# Patient Record
Sex: FEMALE | Race: WHITE | NOT HISPANIC OR LATINO | Employment: FULL TIME | URBAN - METROPOLITAN AREA
[De-identification: names, ages, dates, MRNs, and addresses within clinical notes are randomized per-mention and may not be internally consistent; named-entity substitution may affect disease eponyms.]

---

## 2018-04-21 ENCOUNTER — OFFICE VISIT (OUTPATIENT)
Dept: URGENT CARE | Facility: CLINIC | Age: 33
End: 2018-04-21
Payer: COMMERCIAL

## 2018-04-21 VITALS
HEART RATE: 100 BPM | OXYGEN SATURATION: 98 % | DIASTOLIC BLOOD PRESSURE: 74 MMHG | SYSTOLIC BLOOD PRESSURE: 122 MMHG | TEMPERATURE: 97.9 F

## 2018-04-21 DIAGNOSIS — J02.9 SORE THROAT: ICD-10-CM

## 2018-04-21 DIAGNOSIS — J02.0 STREP PHARYNGITIS: Primary | ICD-10-CM

## 2018-04-21 LAB — S PYO AG THROAT QL: POSITIVE

## 2018-04-21 PROCEDURE — G0382 LEV 3 HOSP TYPE B ED VISIT: HCPCS | Performed by: FAMILY MEDICINE

## 2018-04-21 PROCEDURE — 87430 STREP A AG IA: CPT | Performed by: FAMILY MEDICINE

## 2018-04-21 RX ORDER — AMOXICILLIN 500 MG/1
500 CAPSULE ORAL EVERY 8 HOURS SCHEDULED
Qty: 30 CAPSULE | Refills: 0 | Status: SHIPPED | OUTPATIENT
Start: 2018-04-21 | End: 2018-05-01

## 2018-04-21 NOTE — PATIENT INSTRUCTIONS
For strep screen was positive today  We were informed that you have strep pharyngitis  Please continue with the amoxicillin 500 milligrams 3 times a day for a full 10 days

## 2018-04-21 NOTE — PROGRESS NOTES
[facility] - Patient Visit Note  Donovan Najera 27 y o  female MRN: [id number]      Assessment / Plan:  Sore throat [J02 9]  1  Sore throat  POCT rapid strepA   2  Strep pharyngitis       Reason For Visit / Chief Complaint  Chief Complaint   Patient presents with    Sore Throat     x 2 days             Jean Marie Mayorga Discussion:  Strep screen is positive patient will be treated with amoxicillin for 10 days  HPI:  Donovan Najera is a 27 y o  female Patient           Who  Presents with both her children  Patient states that she has had a sore throat for approximately 3-4 days  She has had a low-grade fever but has not documented the same fever started on Thursday  She has also had the chills  She is having difficult time swallowing due to the pain  She has been nauseated with the same  Both children are not ill  Patient denies pregnancy  She is allergic to hydrocodone  Strep screen is positive this morning         Historical Information   No past medical history on file  No past surgical history on file  Social History   History   Alcohol use Not on file     History   Drug use: Unknown     History   Smoking Status    Not on file   Smokeless Tobacco    Not on file     No family history on file  ALLERGIES:         Allergies   Allergen Reactions    Hydrocodone        MEDS:  No current outpatient prescriptions on file      FACILITY ADMINISTERED MEDS:        REVIEW OF SYSTEMS    GENERAL: NEGATIVE for:  Generalized Fatigue                             Chills                              Fever                             Myalgias     OPTHALMIC: NEGATIVE for:  Diplopia                            Scotomata                            Visual Changes                            Blurred Vision     ENT:  [facility] [place] for:  Hearing Difficulty                            Tinnitus                            Vertigo Dizziness                            Ear Pain                            Ear Drainage               NOSE NEGATIVE for:  Nasal Congestion                            Nasal Discharge                            Sinus Pain / Pressure               THROAT Positive for:  Sore Throat / Throat Pain                            Difficulty Swallowing     RESPIRATORY: NEGATIVE for:  Cough                            Wheezing                            Sputum Production                            Sob / Tachypnea                            Hemoptysis     CARDIOVASCULAR: NEGATIVE for:  Chest Pain                             SOB (cardiac Related)                             Dyspnea on Exertion                             Orthopnea                             PND                             Leg Edema                             Palpitations                               Irregularities/rythym                       CURRENT VITALS:   Blood Pressure: 122/74 (04/21/18 1013)  Pulse: 100 (04/21/18 1013)  Temperature: 97 9 °F (36 6 °C) (04/21/18 1013)  SpO2: 98 % (04/21/18 1013)  /74   Pulse 100   Temp 97 9 °F (36 6 °C)   SpO2 98%       PHYSICAL EXAM:         General Appearance:    Alert, cooperative, no apparent distress, appears stated age     Oriented x3    Head:    Normocephalic, without obvious abnormality, atraumatic   Eyes:      EOM's intact,      OTILIA,        conjunctiva/corneas clear,          fundi not visualized well   Ears:     Normal external ear canals     Tm right side  Normal     Tm left side    Normal       Nose:   Nares normal externally, septum midline,     mucosa normal,     No anterior drainage         Sinuses   with out   tenderness to palpation / percussion     Throat:   Lips, mucosa, and tongue normal       Anterior pharynx shows edematous pillars with blisters  Consistent with strep      Posterior pharynx   Normal      No exudate obvious       Neck:   Supple, symmetrical, trachea midline and moveable    Normal thyroid click present    No carotid bruits appreciated        Lymphatics:     Adenopathy in anterior cervical chain  Normal    Adenopathy in posterior cervical chain   Normal     Lungs:     Clear to auscultation bilaterally    No rales    No ronchi    No wheeze     Heart[de-identified]    Regular rate and rhythm, S1 and S2 normal,     No S3, S4, audible    No murmurs, rubs      Extremities:     Extremities grossly normal     atraumatic,     no cyanosis or edema        Skin:     Skin color, texture, turgor normal, no rashes or lesions                         Follow up at primary care in 2  days    Counseling / Coordination of Care  Total clinic time spent today  15  minutes  Greater than 50% of total time was spent with the patient and / or family counseling and / or coordination of care  Portions of the record may have been created with voice recognition software   Occasional wrong word or "sound a like" substitutions may have occurred due to the inherent limitations of voice recognition software   Read the chart carefully and recognize, using context, where substitutions have occurred

## 2018-08-07 PROCEDURE — G0145 SCR C/V CYTO,THINLAYER,RESCR: HCPCS | Performed by: OBSTETRICS & GYNECOLOGY

## 2018-08-07 PROCEDURE — 87624 HPV HI-RISK TYP POOLED RSLT: CPT | Performed by: OBSTETRICS & GYNECOLOGY

## 2018-08-08 ENCOUNTER — LAB REQUISITION (OUTPATIENT)
Dept: LAB | Facility: HOSPITAL | Age: 33
End: 2018-08-08

## 2018-08-08 DIAGNOSIS — Z01.419 ENCOUNTER FOR GYNECOLOGICAL EXAMINATION WITHOUT ABNORMAL FINDING: ICD-10-CM

## 2018-08-12 LAB — HPV RRNA GENITAL QL NAA+PROBE: NORMAL

## 2018-08-14 LAB
LAB AP GYN PRIMARY INTERPRETATION: NORMAL
LAB AP LMP: NORMAL
Lab: NORMAL

## 2019-07-10 ENCOUNTER — OFFICE VISIT (OUTPATIENT)
Dept: URGENT CARE | Facility: CLINIC | Age: 34
End: 2019-07-10
Payer: COMMERCIAL

## 2019-07-10 VITALS
OXYGEN SATURATION: 98 % | WEIGHT: 293 LBS | BODY MASS INDEX: 45.99 KG/M2 | HEIGHT: 67 IN | RESPIRATION RATE: 18 BRPM | TEMPERATURE: 97.9 F | SYSTOLIC BLOOD PRESSURE: 132 MMHG | DIASTOLIC BLOOD PRESSURE: 80 MMHG

## 2019-07-10 DIAGNOSIS — S39.012A LUMBAR STRAIN, INITIAL ENCOUNTER: Primary | ICD-10-CM

## 2019-07-10 PROCEDURE — 96372 THER/PROPH/DIAG INJ SC/IM: CPT | Performed by: PHYSICIAN ASSISTANT

## 2019-07-10 PROCEDURE — 99213 OFFICE O/P EST LOW 20 MIN: CPT | Performed by: PHYSICIAN ASSISTANT

## 2019-07-10 RX ORDER — NORETHINDRONE ACETATE/ETHINYL ESTRADIOL AND FERROUS FUMARATE 1.5-30(21)
KIT ORAL
Refills: 0 | COMMUNITY
Start: 2019-07-03

## 2019-07-10 RX ORDER — KETOROLAC TROMETHAMINE 30 MG/ML
30 INJECTION, SOLUTION INTRAMUSCULAR; INTRAVENOUS ONCE
Status: COMPLETED | OUTPATIENT
Start: 2019-07-10 | End: 2019-07-10

## 2019-07-10 RX ORDER — METHYLPREDNISOLONE 4 MG/1
TABLET ORAL
Qty: 1 EACH | Refills: 0 | Status: SHIPPED | OUTPATIENT
Start: 2019-07-10 | End: 2019-11-15

## 2019-07-10 RX ORDER — BACLOFEN 10 MG/1
10 TABLET ORAL 3 TIMES DAILY PRN
Qty: 21 TABLET | Refills: 0 | Status: SHIPPED | OUTPATIENT
Start: 2019-07-10

## 2019-07-10 RX ADMIN — KETOROLAC TROMETHAMINE 30 MG: 30 INJECTION, SOLUTION INTRAMUSCULAR; INTRAVENOUS at 08:48

## 2019-07-10 NOTE — PROGRESS NOTES
[facility]        NAME: Etelvina Meléndez is a 26 y o  female  : 1985    MRN: [id number]  DATE: July 10, 2019  TIME: 8:40 AM    Assessment and Plan   Lumbar strain, initial encounter [W60 690B]  1  Lumbar strain, initial encounter  ketorolac (TORADOL) injection 30 mg    methylPREDNISolone 4 MG tablet therapy pack    baclofen 10 mg tablet         Patient Instructions     Recommend medrol pack for symptomatic relief  Encouraged continued activity and gentle ROM exercises  Moist heat as needed and tolerated  Follow up with PCP in 3-5 days  Proceed to  ER if symptoms worsen  Chief Complaint     Chief Complaint   Patient presents with    Back Pain     Pulled muscle in back yesterday now has lower back pain          History of Present Illness       27 y/o F presents for eval of low back pain and spasm onset yesterday  Patient states she was lifting and moving stuff at work when she felt herself pull her back out  She states she has done this in the past but denies specific back injury  She denies any numbness or tingling, fever, N/V, saddle anesthesia, bowel or bladder dysfunction  Pt has tried ibuprofen without improvement  Review of Systems   Review of Systems   All other systems reviewed and are negative          Current Medications       Current Outpatient Medications:     ROCÍO FE 1  1 5-30 MG-MCG tablet, , Disp: , Rfl: 0    baclofen 10 mg tablet, Take 1 tablet (10 mg total) by mouth 3 (three) times a day as needed for muscle spasms, Disp: 21 tablet, Rfl: 0    methylPREDNISolone 4 MG tablet therapy pack, Use as directed on package, Disp: 1 each, Rfl: 0    Current Facility-Administered Medications:     ketorolac (TORADOL) injection 30 mg, 30 mg, Intramuscular, Once, Jasmina Blanchard PA-C    Current Allergies     Allergies as of 07/10/2019 - Reviewed 07/10/2019   Allergen Reaction Noted    Hydrocodone  2018            The following portions of the patient's history were reviewed and updated as appropriate: allergies, current medications, past family history, past medical history, past social history, past surgical history and problem list      History reviewed  No pertinent past medical history  History reviewed  No pertinent surgical history  No family history on file  Medications have been verified  Objective   /80   Temp 97 9 °F (36 6 °C) (Tympanic)   Resp 18   Ht 5' 7" (1 702 m)   Wt (!) 145 kg (320 lb)   LMP 06/24/2019   SpO2 98%   BMI 50 12 kg/m²        Physical Exam     Physical Exam   Constitutional: She is oriented to person, place, and time  She appears well-developed and well-nourished  No distress  HENT:   Head: Normocephalic and atraumatic  Cardiovascular: Normal rate, regular rhythm and normal heart sounds  Exam reveals no gallop and no friction rub  No murmur heard  Pulmonary/Chest: Effort normal and breath sounds normal  She has no wheezes  She has no rales  Musculoskeletal:        Lumbar back: She exhibits decreased range of motion, edema and deformity  She exhibits no spasm  Neurological: She is alert and oriented to person, place, and time  Psychiatric: She has a normal mood and affect  Vitals reviewed

## 2019-11-15 ENCOUNTER — OFFICE VISIT (OUTPATIENT)
Dept: URGENT CARE | Facility: CLINIC | Age: 34
End: 2019-11-15
Payer: COMMERCIAL

## 2019-11-15 VITALS
OXYGEN SATURATION: 97 % | HEART RATE: 110 BPM | WEIGHT: 293 LBS | BODY MASS INDEX: 45.99 KG/M2 | TEMPERATURE: 99 F | DIASTOLIC BLOOD PRESSURE: 78 MMHG | HEIGHT: 67 IN | SYSTOLIC BLOOD PRESSURE: 118 MMHG | RESPIRATION RATE: 18 BRPM

## 2019-11-15 DIAGNOSIS — J02.0 STREP PHARYNGITIS: Primary | ICD-10-CM

## 2019-11-15 LAB — S PYO AG THROAT QL: POSITIVE

## 2019-11-15 PROCEDURE — 99213 OFFICE O/P EST LOW 20 MIN: CPT | Performed by: PHYSICIAN ASSISTANT

## 2019-11-15 PROCEDURE — 87880 STREP A ASSAY W/OPTIC: CPT | Performed by: PHYSICIAN ASSISTANT

## 2019-11-15 RX ORDER — AMOXICILLIN 875 MG/1
875 TABLET, COATED ORAL 2 TIMES DAILY
Qty: 20 TABLET | Refills: 0 | Status: SHIPPED | OUTPATIENT
Start: 2019-11-15 | End: 2019-11-25

## 2019-11-15 NOTE — PROGRESS NOTES
[facility]        NAME: Aleks Bell is a 26 y o  female  : 1985    MRN: [id number]  DATE: November 15, 2019  TIME: 9:03 AM    Assessment and Plan   Strep pharyngitis [J02 0]  1  Strep pharyngitis  POCT rapid strepA    amoxicillin (AMOXIL) 875 mg tablet         Patient Instructions     POCT rapid strep positive - recommend treatment with antibiotic therapy at this time  Also recommend supportive measures:   1  Push fluids and rest   2  OTC Tylenol/Motrin as needed for pain/fever   3  Warm salt water gargles    Follow up with PCP in 3-5 days  Proceed to  ER if symptoms worsen  Chief Complaint     Chief Complaint   Patient presents with    Sore Throat     C/O sore throat and pressure in ears with fever which resolved x 2 days  History of Present Illness       27 y/o F presents for eval of sore throat onset 2 days ago with associated body aches, head pressure, subjective fever/chills  She has been taking dayquil/nyquil with improvement in fever  Review of Systems   Review of Systems   All other systems reviewed and are negative  Current Medications       Current Outpatient Medications:     ROCÍO FE 1  1 5-30 MG-MCG tablet, , Disp: , Rfl: 0    amoxicillin (AMOXIL) 875 mg tablet, Take 1 tablet (875 mg total) by mouth 2 (two) times a day for 10 days, Disp: 20 tablet, Rfl: 0    baclofen 10 mg tablet, Take 1 tablet (10 mg total) by mouth 3 (three) times a day as needed for muscle spasms (Patient not taking: Reported on 11/15/2019), Disp: 21 tablet, Rfl: 0    Current Allergies     Allergies as of 11/15/2019 - Reviewed 11/15/2019   Allergen Reaction Noted    Hydrocodone  2018            The following portions of the patient's history were reviewed and updated as appropriate: allergies, current medications, past family history, past medical history, past social history, past surgical history and problem list      History reviewed   No pertinent past medical history  Past Surgical History:   Procedure Laterality Date    ANKLE SURGERY         No family history on file  Medications have been verified  Objective   /78   Pulse (!) 110   Temp 99 °F (37 2 °C) (Tympanic)   Resp 18   Ht 5' 7" (1 702 m)   Wt (!) 145 kg (320 lb)   LMP 11/13/2019   SpO2 97%   BMI 50 12 kg/m²        Physical Exam     Physical Exam   Constitutional: She is oriented to person, place, and time  She appears well-developed and well-nourished  No distress  HENT:   Head: Normocephalic and atraumatic  Right Ear: Hearing, tympanic membrane, external ear and ear canal normal    Left Ear: Hearing, tympanic membrane, external ear and ear canal normal    Nose: Nose normal    Mouth/Throat: Uvula is midline and mucous membranes are normal  Posterior oropharyngeal erythema present  Tonsillar exudate  Eyes: Pupils are equal, round, and reactive to light  Conjunctivae and EOM are normal    Cardiovascular: Normal rate and regular rhythm  Exam reveals no gallop and no friction rub  No murmur heard  Pulmonary/Chest: Effort normal and breath sounds normal  No respiratory distress  She has no wheezes  She has no rales  Neurological: She is alert and oriented to person, place, and time  Skin: Skin is warm and dry  Nursing note and vitals reviewed

## 2020-05-06 ENCOUNTER — OFFICE VISIT (OUTPATIENT)
Dept: URGENT CARE | Facility: CLINIC | Age: 35
End: 2020-05-06

## 2020-05-06 VITALS
WEIGHT: 293 LBS | OXYGEN SATURATION: 99 % | RESPIRATION RATE: 18 BRPM | HEIGHT: 66 IN | HEART RATE: 99 BPM | SYSTOLIC BLOOD PRESSURE: 135 MMHG | TEMPERATURE: 98.1 F | DIASTOLIC BLOOD PRESSURE: 94 MMHG | BODY MASS INDEX: 47.09 KG/M2

## 2020-05-06 DIAGNOSIS — J02.9 SORE THROAT: ICD-10-CM

## 2020-05-06 DIAGNOSIS — J02.0 STREP PHARYNGITIS: Primary | ICD-10-CM

## 2020-05-06 LAB — S PYO AG THROAT QL: POSITIVE

## 2020-05-06 PROCEDURE — 99214 OFFICE O/P EST MOD 30 MIN: CPT | Performed by: FAMILY MEDICINE

## 2020-05-06 PROCEDURE — 87880 STREP A ASSAY W/OPTIC: CPT | Performed by: FAMILY MEDICINE

## 2020-05-06 RX ORDER — AMOXICILLIN 875 MG/1
875 TABLET, COATED ORAL 2 TIMES DAILY
Qty: 20 TABLET | Refills: 0 | Status: SHIPPED | OUTPATIENT
Start: 2020-05-06 | End: 2020-05-16

## 2020-09-30 ENCOUNTER — OFFICE VISIT (OUTPATIENT)
Dept: URGENT CARE | Facility: CLINIC | Age: 35
End: 2020-09-30
Payer: COMMERCIAL

## 2020-09-30 VITALS
HEART RATE: 117 BPM | SYSTOLIC BLOOD PRESSURE: 136 MMHG | BODY MASS INDEX: 47.09 KG/M2 | RESPIRATION RATE: 20 BRPM | TEMPERATURE: 98.3 F | HEIGHT: 66 IN | OXYGEN SATURATION: 95 % | DIASTOLIC BLOOD PRESSURE: 97 MMHG | WEIGHT: 293 LBS

## 2020-09-30 DIAGNOSIS — J06.9 ACUTE RESPIRATORY DISEASE: Primary | ICD-10-CM

## 2020-09-30 PROCEDURE — G0382 LEV 3 HOSP TYPE B ED VISIT: HCPCS | Performed by: PHYSICIAN ASSISTANT

## 2020-09-30 NOTE — PATIENT INSTRUCTIONS
Some studies suggest that Zinc 12 5-15mg every 2 hours while awake x 5 days may shorten the duration cold symptoms by 1-2 days  Fluids and rest  Flonase nasal spray  Afrin if severe congestion (do not use for more than 3 days)  Warm compresses over sinuses  Steam treatment (utilize proper safety precautions when in contact with hot water/steam)  Follow up with PCP in 3-5 days  Proceed to  ER if symptoms worsen  Cold Symptoms   WHAT YOU NEED TO KNOW:   A cold is an infection caused by a virus  The infection causes your upper respiratory system to become inflamed  Common symptoms of a cold include sneezing, dry throat, a stuffy nose, headache, watery eyes, and a cough  Your cough may be dry, or you may cough up mucus  You may also have muscle aches, joint pain, and tiredness  Rarely, you may have a fever  Most colds go away without treatment  DISCHARGE INSTRUCTIONS:   Return to the emergency department if:   · You have increased tiredness and weakness  · You are unable to eat  · Your heart is beating much faster than usual for you  · You see white spots in the back of your throat and your neck is swollen and sore to the touch  · You see pinpoint or larger reddish-purple dots on your skin  Contact your healthcare provider if:   · You have a fever higher than 102°F (38 9°C)  · You have new or worsening shortness of breath  · You have thick nasal drainage for more than 2 days  · Your symptoms do not improve or get worse within 5 days  · You have questions or concerns about your condition or care  Medicines: The following medicines may be suggested by your healthcare provider to decrease your cold symptoms  These medicines are available without a doctor's order  Ask which medicines to take and when to take them  Follow directions  · NSAIDs or acetaminophen  help to bring down a fever or decrease pain  · Decongestants  help decrease nasal stuffiness       · Antihistamines  help decrease sneezing and a runny nose  · Cough suppressants  help decrease how much you cough  · Expectorants  help loosen mucus so you can cough it up  · Take your medicine as directed  Contact your healthcare provider if you think your medicine is not helping or if you have side effects  Tell him of her if you are allergic to any medicine  Keep a list of the medicines, vitamins, and herbs you take  Include the amounts, and when and why you take them  Bring the list or the pill bottles to follow-up visits  Carry your medicine list with you in case of an emergency  Symptom relief: The following may help relieve cold symptoms, such as a dry throat and congestion:  · Gargle with mouthwash or warm salt water as directed  · Suck on throat lozenges or hard candy  · Use a cold or warm vaporizer or humidifier to ease your breathing  · Rest for at least 2 days and then as needed to decrease tiredness and weakness  · Use petroleum based jelly around your nostrils to decrease irritation from blowing your nose  Drink liquids:  Liquids will help thin and loosen thick mucus so you can cough it up  Liquids will also keep you hydrated  Ask your healthcare provider which liquids are best for you and how much to drink each day  Prevent the spread of germs: You can spread your cold germs to others for at least 3 days after your symptoms start  Wash your hands often  Do not share items, such as eating utensils  Cover your nose and mouth when you cough or sneeze using the crook of your elbow instead of your hands  Throw used tissues in the garbage  Do not smoke:  Smoking may worsen your symptoms and increase the length of time you feel sick  Talk with your healthcare provider if you need help to stop smoking  Follow up with your healthcare provider as directed:  Write down your questions so you remember to ask them during your visits     © 2017 [employer] Information is for End User's use only and may not be sold, redistributed or otherwise used for commercial purposes  All illustrations and images included in CareNotes® are the copyrighted property of A [employer]  or [employer]var[employer] Osborne  The above information is an  only  It is not intended as medical advice for individual conditions or treatments  Talk to your doctor, nurse or pharmacist before following any medical regimen to see if it is safe and effective for you

## 2020-09-30 NOTE — PROGRESS NOTES
[facility]        NAME: Casey Rebollar is a 37 y o  female  : 1985    MRN: [id number]  DATE: 2020  TIME: 2:31 PM    Assessment and Plan   Acute respiratory disease [J06 9]  1  Acute respiratory disease       Patient declined COVID testing  Patient Instructions     Some studies suggest that Zinc 12 5-15mg every 2 hours while awake x 5 days may shorten the duration cold symptoms by 1-2 days  Fluids and rest  Flonase nasal spray  Afrin if severe congestion (do not use for more than 3 days)  Warm compresses over sinuses  Steam treatment (utilize proper safety precautions when in contact with hot water/steam)  Follow up with PCP in 3-5 days  Proceed to  ER if symptoms worsen  Chief Complaint     Chief Complaint   Patient presents with    Cold Like Symptoms     c/o sinus pressure and cough since Monday, sore throat on Monday which is now gone  Denies fever  History of Present Illness       Denies surgery in past 4 weeks, immobilization over 3 days, calf pain/swelling, hemoptysis, prior DVT/PE, clotting disorder, or current CA diagnosis/tx  URI    This is a new problem  Episode onset: 2d  The problem has been gradually improving  There has been no fever  Associated symptoms include coughing (mild productive and intermittent ), headaches and a sore throat (resolved)  Pertinent negatives include no abdominal pain, congestion, ear pain, nausea, rash, rhinorrhea, sinus pain, sneezing or vomiting  Treatments tried: tylenol cold and flu  The treatment provided moderate relief  Review of Systems   Review of Systems   Constitutional: Positive for fatigue  Negative for chills and fever  HENT: Positive for postnasal drip, sinus pressure, sore throat (resolved) and voice change  Negative for congestion, ear pain, rhinorrhea, sinus pain and sneezing  Respiratory: Positive for cough (mild productive and intermittent )      Gastrointestinal: Negative for abdominal pain, nausea and vomiting  Musculoskeletal: Negative for myalgias  Skin: Negative for rash  Neurological: Positive for headaches  Current Medications       Current Outpatient Medications:     ROCÍO FE 1 5/30 1 5-30 MG-MCG tablet, , Disp: , Rfl: 0    baclofen 10 mg tablet, Take 1 tablet (10 mg total) by mouth 3 (three) times a day as needed for muscle spasms (Patient not taking: Reported on 11/15/2019), Disp: 21 tablet, Rfl: 0    Current Allergies     Allergies as of 09/30/2020 - Reviewed 09/30/2020   Allergen Reaction Noted    Hydrocodone  04/21/2018            The following portions of the patient's history were reviewed and updated as appropriate: allergies, current medications, past family history, past medical history, past social history, past surgical history and problem list      History reviewed  No pertinent past medical history  Past Surgical History:   Procedure Laterality Date    ANKLE SURGERY         No family history on file  Medications have been verified  Objective   /97 (BP Location: Left arm, Patient Position: Sitting, Cuff Size: Large)   Pulse (!) 117   Temp 98 3 °F (36 8 °C) (Temporal)   Resp 20   Ht 5' 6" (1 676 m)   Wt (!) 152 kg (335 lb)   SpO2 95%   BMI 54 07 kg/m²        Physical Exam     Physical Exam  Vitals signs reviewed  Constitutional:       General: She is not in acute distress  Appearance: She is well-developed  She is not diaphoretic  HENT:      Head: Normocephalic  Comments: No sinus TTP     Right Ear: Tympanic membrane and external ear normal       Left Ear: Tympanic membrane and external ear normal       Nose: Nose normal       Mouth/Throat:      Pharynx: No oropharyngeal exudate or posterior oropharyngeal erythema  Eyes:      Conjunctiva/sclera: Conjunctivae normal    Cardiovascular:      Rate and Rhythm: Normal rate and regular rhythm  Heart sounds: Normal heart sounds  No murmur  No friction rub  No gallop  Pulmonary:      Effort: Pulmonary effort is normal  No respiratory distress  Breath sounds: Normal breath sounds  No wheezing or rales  Chest:      Chest wall: No tenderness  Lymphadenopathy:      Cervical: No cervical adenopathy  Skin:     General: Skin is warm  Neurological:      Mental Status: She is alert and oriented to person, place, and time  Psychiatric:         Behavior: Behavior normal          Thought Content:  Thought content normal          Judgment: Judgment normal

## 2020-12-15 ENCOUNTER — TELEPHONE (OUTPATIENT)
Dept: BARIATRICS | Facility: CLINIC | Age: 35
End: 2020-12-15

## 2021-02-03 ENCOUNTER — IMMUNIZATIONS (OUTPATIENT)
Dept: FAMILY MEDICINE CLINIC | Facility: HOSPITAL | Age: 36
End: 2021-02-03

## 2021-02-03 DIAGNOSIS — Z23 ENCOUNTER FOR IMMUNIZATION: Primary | ICD-10-CM

## 2021-02-03 PROCEDURE — 91301 SARS-COV-2 / COVID-19 MRNA VACCINE (MODERNA) 100 MCG: CPT

## 2021-02-03 PROCEDURE — 0011A SARS-COV-2 / COVID-19 MRNA VACCINE (MODERNA) 100 MCG: CPT

## 2021-03-01 ENCOUNTER — IMMUNIZATIONS (OUTPATIENT)
Dept: FAMILY MEDICINE CLINIC | Facility: HOSPITAL | Age: 36
End: 2021-03-01

## 2021-03-01 DIAGNOSIS — Z23 ENCOUNTER FOR IMMUNIZATION: Primary | ICD-10-CM

## 2021-03-01 PROCEDURE — 0012A SARS-COV-2 / COVID-19 MRNA VACCINE (MODERNA) 100 MCG: CPT

## 2021-03-01 PROCEDURE — 91301 SARS-COV-2 / COVID-19 MRNA VACCINE (MODERNA) 100 MCG: CPT

## 2022-11-22 ENCOUNTER — OFFICE VISIT (OUTPATIENT)
Dept: URGENT CARE | Facility: CLINIC | Age: 37
End: 2022-11-22

## 2022-11-22 VITALS
OXYGEN SATURATION: 100 % | HEART RATE: 82 BPM | WEIGHT: 293 LBS | TEMPERATURE: 97.6 F | DIASTOLIC BLOOD PRESSURE: 93 MMHG | SYSTOLIC BLOOD PRESSURE: 122 MMHG | BODY MASS INDEX: 47.09 KG/M2 | HEIGHT: 66 IN

## 2022-11-22 DIAGNOSIS — J02.0 STREP PHARYNGITIS: Primary | ICD-10-CM

## 2022-11-22 DIAGNOSIS — J02.9 SORE THROAT: ICD-10-CM

## 2022-11-22 LAB — S PYO AG THROAT QL: NEGATIVE

## 2022-11-22 RX ORDER — PENICILLIN V POTASSIUM 500 MG/1
500 TABLET ORAL 2 TIMES DAILY
Qty: 20 TABLET | Refills: 0 | Status: SHIPPED | OUTPATIENT
Start: 2022-11-22 | End: 2022-12-02

## 2022-11-22 NOTE — PATIENT INSTRUCTIONS
Your strep A is negative  You have a throat culture pending  You are to download [facility] for the results in 3-4 days  You will be notified if the results are + and an antibiotic will be called in for you  You are to do warm salt water gargles 4 x daily  Drink warm tea with honey and lemon  Take tylenol or motrin as able for pain or fever  Chloraseptic throat spray, cough drops  Do not share utensils  Change your tooth brush in 3 days    Follow up with your PCP in 2-3 days  Go to the ED if symptoms worsen

## 2022-11-22 NOTE — PROGRESS NOTES
[facility]        NAME: María Traore is a 43 y o  female  : 1985    MRN: [id number]  DATE: 2022  TIME: 8:42 AM    Assessment and Plan   Strep pharyngitis [J02 0]  1  Strep pharyngitis  penicillin V potassium (VEETID) 500 mg tablet      2  Sore throat  POCT rapid strepA    penicillin V potassium (VEETID) 500 mg tablet    CANCELED: Throat culture            Patient Instructions       Follow up with PCP in 3-5 days  Proceed to  ER if symptoms worsen  Your strep A is negative  You have a throat culture pending  You are to download  [employer] for the results in 3-4 days  You will be notified if the results are + and an antibiotic will be called in for you  You are to do warm salt water gargles 4 x daily  Drink warm tea with honey and lemon  Take tylenol or motrin as able for pain or fever  Chloraseptic throat spray, cough drops  Do not share utensils  Change your tooth brush in 3 days  Follow up with your PCP in 2-3 days  Go to the ED if symptoms worsen        Chief Complaint     Chief Complaint   Patient presents with   • Sore Throat     For 24h, possible fever- unknown  • Ear Fullness     Bilateral ear pressure x1 day         History of Present Illness       This is a 43year old female who states started yesterday with sorethroat, swollen glands, subjective fever  She states that she took some tylenol  She denies pregnancy  Review of Systems   Review of Systems   Constitutional: Positive for fever  HENT: Positive for sore throat  Negative for congestion  Eyes: Negative  Respiratory: Negative  Cardiovascular: Negative  Gastrointestinal: Negative  Endocrine: Negative  Genitourinary: Negative  Musculoskeletal: Negative  Skin: Negative  Allergic/Immunologic: Negative  Neurological: Negative  Hematological: Negative  Psychiatric/Behavioral: Negative            Current Medications       Current Outpatient Medications:   •  ROCÍO MYRA 1 5/30 1 5-30 MG-MCG tablet, , Disp: , Rfl: 0  •  penicillin V potassium (VEETID) 500 mg tablet, Take 1 tablet (500 mg total) by mouth 2 (two) times a day for 10 days, Disp: 20 tablet, Rfl: 0  •  baclofen 10 mg tablet, Take 1 tablet (10 mg total) by mouth 3 (three) times a day as needed for muscle spasms (Patient not taking: Reported on 11/15/2019), Disp: 21 tablet, Rfl: 0    Current Allergies     Allergies as of 11/22/2022 - Reviewed 11/22/2022   Allergen Reaction Noted   • Hydrocodone Other (See Comments) 04/21/2018            The following portions of the patient's history were reviewed and updated as appropriate: allergies, current medications, past family history, past medical history, past social history, past surgical history and problem list      History reviewed  No pertinent past medical history  Past Surgical History:   Procedure Laterality Date   • ANKLE SURGERY         History reviewed  No pertinent family history  Medications have been verified  Objective   /93   Pulse 82   Temp 97 6 °F (36 4 °C)   Ht 5' 6" (1 676 m)   Wt (!) 154 kg (340 lb 6 4 oz)   SpO2 100%   BMI 54 94 kg/m²   No LMP recorded  Physical Exam     Physical Exam  Vitals and nursing note reviewed  Constitutional:       General: She is not in acute distress  Appearance: She is well-developed  She is obese  She is not ill-appearing, toxic-appearing or diaphoretic  HENT:      Head: Normocephalic and atraumatic  Right Ear: Tympanic membrane and ear canal normal       Left Ear: Tympanic membrane and ear canal normal       Nose: No congestion or rhinorrhea  Mouth/Throat:      Mouth: Mucous membranes are moist  No oral lesions  Pharynx: Uvula midline  Oropharyngeal exudate and posterior oropharyngeal erythema present  No pharyngeal swelling or uvula swelling  Tonsils: Tonsillar exudate present  No tonsillar abscesses  3+ on the right  2+ on the left     Cardiovascular:      Rate and Rhythm: Normal rate and regular rhythm  Heart sounds: Normal heart sounds  No murmur heard  Pulmonary:      Effort: Pulmonary effort is normal       Breath sounds: Normal breath sounds  Abdominal:      General: Bowel sounds are normal       Palpations: Abdomen is soft  Musculoskeletal:      Cervical back: Normal range of motion and neck supple  Lymphadenopathy:      Cervical: Cervical adenopathy present  Skin:     General: Skin is warm and dry  Capillary Refill: Capillary refill takes less than 2 seconds  Neurological:      General: No focal deficit present  Mental Status: She is alert and oriented to person, place, and time     Psychiatric:         Mood and Affect: Mood normal          Behavior: Behavior normal

## 2022-11-22 NOTE — LETTER
November 22, 2022      Patient: Patel Urbano   YOB: 1985   Date of Visit: 11/22/2022       To Whom It May Concern: It is my medical opinion that Patel Urbano may return to work on 11/23/2022  If you have any questions or concerns, please don't hesitate to call           Sincerely,        ALEXUS Hayes    CC:   No Recipients

## 2023-08-17 ENCOUNTER — OFFICE VISIT (OUTPATIENT)
Dept: BARIATRICS | Facility: CLINIC | Age: 38
End: 2023-08-17
Payer: COMMERCIAL

## 2023-08-17 VITALS
DIASTOLIC BLOOD PRESSURE: 80 MMHG | WEIGHT: 293 LBS | RESPIRATION RATE: 17 BRPM | BODY MASS INDEX: 47.09 KG/M2 | HEART RATE: 73 BPM | SYSTOLIC BLOOD PRESSURE: 120 MMHG | HEIGHT: 66 IN

## 2023-08-17 DIAGNOSIS — R63.5 WEIGHT GAIN: ICD-10-CM

## 2023-08-17 DIAGNOSIS — Z30.41 ORAL CONTRACEPTIVE USE: ICD-10-CM

## 2023-08-17 DIAGNOSIS — E66.01 OBESITY, MORBID, BMI 50 OR HIGHER (HCC): Primary | ICD-10-CM

## 2023-08-17 PROCEDURE — 99204 OFFICE O/P NEW MOD 45 MIN: CPT | Performed by: INTERNAL MEDICINE

## 2023-08-17 NOTE — PATIENT INSTRUCTIONS
General Recommendations:  Nutrition:  Eat breakfast daily. Do not skip meals. Food log (ie.) www.myfitnesspal.com, sparkpeople. com, loseit.com, calorieking. com, etc.    Practice mindful eating. Be sure to set aside time to eat, eat slowly, and savor your food. Hydration: At least 64oz of water daily. No sugar sweetened beverages. No juice (eat the fruit instead). Exercise:  Studies have shown that the ideal exercise goal is somewhere between 150 to 300 minutes of moderate intensity exercise a week. Start with exercising 10 minutes every other day and gradually increase physical activity with a goal of at least 150 minutes of moderate intensity exercise a week, divided over at least 3 days a week. An example of this would be exercising 30 minutes a day, 5 days a week. Resistance training can increase muscle mass and increase our resting metabolic rate. FULL BODY resistance training is recommended 2-3 times a week. Do not do this on consecutive days to allow for muscle recovery. Aim for a bare minimum 5000 steps, even on days you do not exercise. Monitoring:   Weigh yourself once a week. Weigh yourself the same time of the day with the same amount of clothing on. Preferably this should be done after waking up, before you eat, and with no clothing or minimal clothing on. Specific Goals:  Food log (ie.) www.Angelantoni.com,[employer],flikdate,calorieking. com,etc.   No sugary beverages. At least 64oz of water daily. Calorie goal:  2000 calories a day (Provided with meal plan to follow). I highly recommend meeting with a dietician. Meet with behavioral health specialist.    Surgical team will look into insurance coverage and contact you. Have thyroid test done and EKG done. Once reviewed and if normal, then you may start phentermine 15mg daily.   Potential side effects of Phentermine: increased heart rate, increased blood pressure, palpitations, headache, dizziness, insomnia, altered mood, abdominal upset, and dry mouth. Notify the provider with change in mood. Please go to the emergency room if you develop thoughts of harming yourself or others. Phentermine should be stopped 2 weeks prior to surgery. Notify the provider with any changes in vision. After starting or increasing dose of phentermine it is advised that you check your resting blood pressure and pulse at least 3 times per week for a month. Vary the time of day and record these results. If you have any resting blood pressures above 140/90 or heart rate above 100 beats per minute, then you must notify your weight loss specialist and primary care doctor.     Return visit:  3 month

## 2023-08-17 NOTE — PROGRESS NOTES
Assessment/Plan:  Mary House was seen today for consult. Diagnoses and all orders for this visit:    Obesity, morbid, BMI 50 or higher ([address])  -     TSH, 3rd generation with Free T4 reflex; Future  -     ECG 12 lead; Future    Weight gain  -     TSH, 3rd generation with Free T4 reflex; Future    Oral contraceptive use       Obesity, morbid, BMI 50 or higher ([address])  - Discussed options of HealthyCORE-Intensive Lifestyle Intervention Program, Very Low Calorie Diet-VLCD, Conservative Program, Chuy-En-Y Gastric Bypass, and Vertical Sleeve Gastrectomy and the role of weight loss medications. - Explained the importance of making lifestyle changes. - Patient is interested in pursuing Chuy-En-Y Gastric Bypass and Vertical Sleeve Gastrectomy  - Initial weight loss goal of 5-10% weight loss for improved health as studies have shown this is where we see the greatest impact on improving health and decreasing risk of obesity related conditions. - Weight loss can improve patient's co-morbid conditions and/or prevent weight-related complications. - Stop Bang 3/8  - Labs and EKG ordered. BP: controlled  Pulse: controlled  Contraception: patient advised of potential harm to fetus if pregnancy occurs, advised contraceptive methods. No blurry vision, depression or anxiety, no insomnia, no heart palpitations or chest pain. PDMP reviewed. No evidence or suspicion of abuse or diversion. Patient understood all instructions and agrees to continue the medications    General Recommendations:  Nutrition:  Eat breakfast daily. Do not skip meals. Food log (ie.) www.Sapiens.com, sparkpeople. com, loseit.com, [employer]. com, etc.    Practice mindful eating. Be sure to set aside time to eat, eat slowly, and savor your food. Hydration: At least 64oz of water daily. No sugar sweetened beverages. No juice (eat the fruit instead).     Exercise:  Studies have shown that the ideal exercise goal is somewhere between 150 to 300 minutes of moderate intensity exercise a week. Start with exercising 10 minutes every other day and gradually increase physical activity with a goal of at least 150 minutes of moderate intensity exercise a week, divided over at least 3 days a week. An example of this would be exercising 30 minutes a day, 5 days a week. Resistance training can increase muscle mass and increase our resting metabolic rate. FULL BODY resistance training is recommended 2-3 times a week. Do not do this on consecutive days to allow for muscle recovery. Aim for a bare minimum 5000 steps, even on days you do not exercise. Monitoring:   Weigh yourself once a week. Weigh yourself the same time of the day with the same amount of clothing on. Preferably this should be done after waking up, before you eat, and with no clothing or minimal clothing on. Specific Goals:  Food log (ie.) www.[employer].com,"[employer]voxappit.com,[employer] com,etc.   No sugary beverages. At least 64oz of water daily. Calorie goal:  2000 calories a day (Provided with meal plan to follow). I highly recommend meeting with a dietician. Meet with behavioral health specialist.    Surgical team will look into insurance coverage and contact you. Have thyroid test done and EKG done. Once reviewed and if normal, then you may start phentermine 15mg daily. Potential side effects of Phentermine: increased heart rate, increased blood pressure, palpitations, headache, dizziness, insomnia, altered mood, abdominal upset, and dry mouth. Notify the provider with change in mood. Please go to the emergency room if you develop thoughts of harming yourself or others. Phentermine should be stopped 2 weeks prior to surgery. Notify the provider with any changes in vision. After starting or increasing dose of phentermine it is advised that you check your resting blood pressure and pulse at least 3 times per week for a month.   Vary the time of day and record these results. If you have any resting blood pressures above 140/90 or heart rate above 100 beats per minute, then you must notify your weight loss specialist and primary care doctor. Return visit:  3 month         Total time spent reviewing chart, interviewing patient, examining patient, discussing plan, answering all questions, and documentin min.       ______________________________________________________________________    Subjective:   Chief Complaint   Patient presents with   • Consult     MWM Consult; Gw-200LB ; Waist- 53IN; Stop bang- 3/8       HPI: Nicholas Cam  is a 42 y.o. female with history of excess weight, here to pursue weight loss management. Previous notes and records have been reviewed. HPI  Wt Readings from Last 20 Encounters:   23 (!) 156 kg (344 lb 12.8 oz)   22 (!) 154 kg (340 lb 6.4 oz)   20 (!) 152 kg (335 lb)   20 (!) 145 kg (320 lb 6.4 oz)   11/15/19 (!) 145 kg (320 lb)   07/10/19 (!) 145 kg (320 lb)     Excess Weight:  Highest weight: 344  Current weight: 344  What has been tried: Diet and Exercise   Contributing factors: Lack of food prep, types of food, portion size  Associated symptoms and effects: fatigue and increased joint pain    Food logging:  Hunger/Cravings: "I like sugars" Chips, cookies  Dining out: 1-2 a week  Hydration:  Water 40-46 working toward 64oz. Coffee with cream 3 creamers  Alcohol:  No  Smoking:  No  Exercise:  No  Weight Monitoring:  No  Sleep: 6-7  STOP-BANG Score: 3/8  Occupation:  [employer]    History reviewed. No pertinent past medical history. Patient denies personal and family history of  pancreatitis, thyroid cancer, MEN-2 tumors. Denies any hx of glaucoma, seizures, kidney stones, gallstones. Denies Hx of CAD, PAD, palpitations, arrhythmia. Denies uncontrolled anxiety or depression, suicidal behavior or thinking , insomnia or sleep disturbance.    Past Surgical History:   Procedure Laterality Date • ANKLE SURGERY       The following portions of the patient's history were reviewed and updated as appropriate: allergies, current medications, past family history, past medical history, past social history, past surgical history, and problem list.    Review Of Systems:  Review of Systems   Constitutional: Negative for activity change, appetite change, fatigue and fever. Respiratory: Negative for cough and shortness of breath. Cardiovascular: Negative for chest pain, palpitations and leg swelling. Gastrointestinal: Negative for abdominal pain, constipation, diarrhea, nausea and vomiting. Endocrine: Negative for cold intolerance and heat intolerance. Genitourinary: Negative for difficulty urinating and dysuria. Musculoskeletal: Negative for arthralgias, back pain and gait problem. Skin: Negative for pallor and rash. Neurological: Negative for headaches. Psychiatric/Behavioral: Negative for dysphoric mood, sleep disturbance and suicidal ideas (or HI). The patient is not nervous/anxious. Objective:  /80   Pulse 73   Resp 17   Ht 5' 6" (1.676 m)   Wt (!) 156 kg (344 lb 12.8 oz)   BMI 55.65 kg/m²   Physical Exam  Vitals and nursing note reviewed. Constitutional:       General: She is not in acute distress. Appearance: Normal appearance. She is not ill-appearing or diaphoretic. Eyes:      General: No scleral icterus. Cardiovascular:      Rate and Rhythm: Normal rate and regular rhythm. Pulses: Normal pulses. Heart sounds: No murmur heard. Pulmonary:      Effort: Pulmonary effort is normal. No respiratory distress. Breath sounds: Normal breath sounds. No stridor. No wheezing or rhonchi. Abdominal:      General: Bowel sounds are normal. There is no distension. Palpations: Abdomen is soft. There is no mass. Tenderness: There is no abdominal tenderness. Hernia: No hernia is present. Musculoskeletal:      Cervical back: Neck supple.       Right lower leg: No edema. Left lower leg: No edema. Lymphadenopathy:      Cervical: No cervical adenopathy. Skin:     Capillary Refill: Capillary refill takes less than 2 seconds. Findings: No lesion or rash. Neurological:      Mental Status: She is alert and oriented to person, place, and time. Gait: Gait normal.   Psychiatric:         Mood and Affect: Mood normal.         Behavior: Behavior normal.       Labs and Imaging  Recent labs and imaging have been personally reviewed.   Lab Results   Component Value Date    WBC 14.21 (H) 12/08/2015    HGB 10.4 (L) 12/08/2015    HCT 32.4 (L) 12/08/2015    MCV 82 12/08/2015     12/08/2015

## 2023-08-17 NOTE — ASSESSMENT & PLAN NOTE
- Discussed options of HealthyCORE-Intensive Lifestyle Intervention Program, Very Low Calorie Diet-VLCD, Conservative Program, Chuy-En-Y Gastric Bypass, and Vertical Sleeve Gastrectomy and the role of weight loss medications. - Explained the importance of making lifestyle changes. - Patient is interested in pursuing Chuy-En-Y Gastric Bypass and Vertical Sleeve Gastrectomy  - Initial weight loss goal of 5-10% weight loss for improved health as studies have shown this is where we see the greatest impact on improving health and decreasing risk of obesity related conditions. - Weight loss can improve patient's co-morbid conditions and/or prevent weight-related complications. - Stop Amaya Muller 3/8  -Discussed the various AOM available and the risk and benefit of each as well as mechanism of action. Patient is most interested in phentermine at this time. Labs and EKG ordered. BP: controlled  Pulse: controlled  Contraception: patient advised of potential harm to fetus if pregnancy occurs, advised contraceptive methods. No blurry vision, depression or anxiety, no insomnia, no heart palpitations or chest pain. PDMP reviewed. No evidence or suspicion of abuse or diversion. Patient understood all instructions and agrees to continue the medications    General Recommendations:  Nutrition:  Eat breakfast daily. Do not skip meals. Food log (ie.) www.[employer].com, sparkpeople. com, loseit.com, calorieking. com, etc.    Practice mindful eating. Be sure to set aside time to eat, eat slowly, and savor your food. Hydration: At least 64oz of water daily. No sugar sweetened beverages. No juice (eat the fruit instead). Exercise:  Studies have shown that the ideal exercise goal is somewhere between 150 to 300 minutes of moderate intensity exercise a week.   Start with exercising 10 minutes every other day and gradually increase physical activity with a goal of at least 150 minutes of moderate intensity exercise a week, divided over at least 3 days a week. An example of this would be exercising 30 minutes a day, 5 days a week. Resistance training can increase muscle mass and increase our resting metabolic rate. FULL BODY resistance training is recommended 2-3 times a week. Do not do this on consecutive days to allow for muscle recovery. Aim for a bare minimum 5000 steps, even on days you do not exercise. Monitoring:   Weigh yourself once a week. Weigh yourself the same time of the day with the same amount of clothing on. Preferably this should be done after waking up, before you eat, and with no clothing or minimal clothing on. Specific Goals:  Food log (ie.) www.[employer].com,[employer],calorieking. com,etc.   No sugary beverages. At least 64oz of water daily. Calorie goal:  2000 calories a day (Provided with meal plan to follow). I highly recommend meeting with a dietician. Meet with behavioral health specialist.    Surgical team will look into insurance coverage and contact you. Have thyroid test done and EKG done. Once reviewed and if normal, then you may start phentermine 15mg daily. Potential side effects of Phentermine: increased heart rate, increased blood pressure, palpitations, headache, dizziness, insomnia, altered mood, abdominal upset, and dry mouth. Notify the provider with change in mood. Please go to the emergency room if you develop thoughts of harming yourself or others. Phentermine should be stopped 2 weeks prior to surgery. Notify the provider with any changes in vision. After starting or increasing dose of phentermine it is advised that you check your resting blood pressure and pulse at least 3 times per week for a month. Vary the time of day and record these results. If you have any resting blood pressures above 140/90 or heart rate above 100 beats per minute, then you must notify your weight loss specialist and primary care doctor.     Return visit:  3 month

## 2023-09-22 ENCOUNTER — CLINICAL SUPPORT (OUTPATIENT)
Dept: BARIATRICS | Facility: CLINIC | Age: 38
End: 2023-09-22

## 2023-09-22 VITALS
RESPIRATION RATE: 16 BRPM | DIASTOLIC BLOOD PRESSURE: 84 MMHG | BODY MASS INDEX: 47.09 KG/M2 | WEIGHT: 293 LBS | HEART RATE: 84 BPM | HEIGHT: 66 IN | SYSTOLIC BLOOD PRESSURE: 112 MMHG

## 2023-09-22 DIAGNOSIS — R63.5 ABNORMAL WEIGHT GAIN: Primary | ICD-10-CM

## 2023-09-22 PROCEDURE — RECHECK

## 2023-09-22 NOTE — PROGRESS NOTES
Patient last visit weight:  Patient current visit weight:    If you are taking phentermine or other oral weight loss medications, are you experiencing any of the following symptoms:  Headache:   Blurred Vision:   Chest Pain:   Palpitations: Insomnia:   SPECIFY ORAL MEDICATION AND DOSAGE:     If you are taking an injectable medication,  are you experiencing any of the following symptoms:  Bloating:   Nausea:  Vomiting:   Constipation:   Diarrhea:  SPECIFY INJECTABLE MEDICATION AND CURRENT DOSAGE:  Pt is not on any Wt lose Medication    Vitals:    - Is BP less than 100/60? No  - Is BP greater than 140/90? No  - Is HR greater than 100? NO  **If yes to any of the above, have patient relax and repeat in 5-10 minutes**    Repeat values:    - Is BP less than 100/60?  - Is BP greater than 140/90?  - Is HR greater than 100?   **If values remain outside of ranges above, please consult provider for next steps**

## 2023-12-15 ENCOUNTER — APPOINTMENT (OUTPATIENT)
Dept: LAB | Facility: HOSPITAL | Age: 38
End: 2023-12-15
Payer: COMMERCIAL

## 2023-12-15 ENCOUNTER — OFFICE VISIT (OUTPATIENT)
Dept: LAB | Facility: HOSPITAL | Age: 38
End: 2023-12-15
Payer: COMMERCIAL

## 2023-12-15 DIAGNOSIS — E66.01 OBESITY, MORBID, BMI 50 OR HIGHER (HCC): ICD-10-CM

## 2023-12-15 DIAGNOSIS — R63.5 WEIGHT GAIN: ICD-10-CM

## 2023-12-15 LAB
ATRIAL RATE: 85 BPM
P AXIS: 40 DEGREES
PR INTERVAL: 146 MS
QRS AXIS: 19 DEGREES
QRSD INTERVAL: 86 MS
QT INTERVAL: 356 MS
QTC INTERVAL: 423 MS
T WAVE AXIS: 25 DEGREES
TSH SERPL DL<=0.05 MIU/L-ACNC: 2.06 UIU/ML (ref 0.45–4.5)
VENTRICULAR RATE: 85 BPM

## 2023-12-15 PROCEDURE — 36415 COLL VENOUS BLD VENIPUNCTURE: CPT

## 2023-12-15 PROCEDURE — 84443 ASSAY THYROID STIM HORMONE: CPT

## 2023-12-15 PROCEDURE — 93005 ELECTROCARDIOGRAM TRACING: CPT

## 2023-12-20 ENCOUNTER — OFFICE VISIT (OUTPATIENT)
Dept: BARIATRICS | Facility: CLINIC | Age: 38
End: 2023-12-20
Payer: COMMERCIAL

## 2023-12-20 VITALS
BODY MASS INDEX: 48.82 KG/M2 | WEIGHT: 293 LBS | SYSTOLIC BLOOD PRESSURE: 120 MMHG | HEIGHT: 65 IN | RESPIRATION RATE: 16 BRPM | HEART RATE: 104 BPM | DIASTOLIC BLOOD PRESSURE: 78 MMHG

## 2023-12-20 DIAGNOSIS — E66.01 OBESITY, MORBID, BMI 50 OR HIGHER (HCC): Primary | ICD-10-CM

## 2023-12-20 PROCEDURE — 99214 OFFICE O/P EST MOD 30 MIN: CPT | Performed by: INTERNAL MEDICINE

## 2023-12-20 NOTE — PROGRESS NOTES
Assessment/Plan:  Kim was seen today for follow-up.    Diagnoses and all orders for this visit:    Obesity, morbid, BMI 50 or higher (HCC)       No problem-specific Assessment & Plan notes found for this encounter.     Initial: 344 lbs  Current: 339 lbs  Change: -5 lbs  Goal <300  - Weight not at goal  - Patient is interested in   - Labs reviewed: As below.  BP: controlled  Pulse: controlled  Contraception: patient advised of potential harm to fetus if pregnancy occurs, advised contraceptive methods.  No blurry vision, depression or anxiety, no insomnia, no heart palpitations or chest pain.  PDMP reviewed.  No evidence or suspicion of abuse or diversion.  Patient understood all instructions and agrees to continue the medications      General Recommendations:  Nutrition:  Eat breakfast daily.  Do not skip meals.     Food log (ie.) www.[place].com, sparkpeople.com, loseit.com, calorieking.com, etc.    Practice mindful eating.  Be sure to set aside time to eat, eat slowly, and savor your food.    Hydration:    At least 64oz of water daily.  No sugar sweetened beverages.  No juice (eat the fruit instead).    Exercise:  Studies have shown that the ideal exercise goal is somewhere between 150 to 300 minutes of moderate intensity exercise a week.  Start with exercising 10 minutes every other day and gradually increase physical activity with a goal of at least 150 minutes of moderate intensity exercise a week, divided over at least 3 days a week.  An example of this would be exercising 30 minutes a day, 5 days a week.  Resistance training can increase muscle mass and increase our resting metabolic rate.   FULL BODY resistance training is recommended 2-3 times a week.  Do not do this on consecutive days to allow for muscle recovery.    Aim for a bare minimum 5000 steps, even on days you do not exercise.    Monitoring:   Weigh yourself daily.  If this causes undue stress, then just weigh yourself once a week.  Weigh  yourself the same time of the day with the same amount of clothing on.  Preferably this should be done after waking up, before you eat, and with no clothing or minimal clothing on.    Specific Goals:  Food log (ie.) www.[employer].com,zeenworld.com,loseit.com,[employer].com,etc.   Do this every single day for 1 week.  Then do this 1 day a week, every week.  Calorie goal:  4165-1218 calories a day.    No sugary beverages. At least 64oz of water daily.    Step count.  Aim for a minimum of 5000 step.     Phentermine 15mg daily.  Potential side effects of Phentermine: increased heart rate, increased blood pressure, palpitations, headache, dizziness, insomnia, altered mood, abdominal upset, and dry mouth. Notify the provider with change in mood. Please go to the emergency room if you develop thoughts of harming yourself or others. Phentermine should be stopped 2 weeks prior to surgery. Notify the provider with any changes in vision.     After starting or increasing dose of phentermine it is advised that you check your resting blood pressure and pulse at least 3 times per week for a month.  Vary the time of day and record these results.  If you have any resting blood pressures above 140/90 or heart rate above 100 beats per minute, then you must notify your weight loss specialist and primary care doctor.    Nurse visit in 2-4 weeks.    Return visit:  3 months        Total time spent reviewing chart, interviewing patient, examining patient, discussing plan, answering all questions, and documentin min.       ______________________________________________________________________      Subjective:   Chief Complaint   Patient presents with    Follow-up     MWM- 4mth f/u- Waist 55in     Patient here to discuss weight associated problems and nutrition goals  HPI: Kim Mojica  is a 37 y.o. female with history of excess weight.  Weight loss plan:  [facility].   Most recent notes and records were reviewed.    Wt  "Readings from Last 10 Encounters:   12/20/23 (!) 154 kg (339 lb 3.2 oz)   09/22/23 (!) 151 kg (333 lb)   08/17/23 (!) 156 kg (344 lb 12.8 oz)   11/22/22 (!) 154 kg (340 lb 6.4 oz)   09/30/20 (!) 152 kg (335 lb)   05/06/20 (!) 145 kg (320 lb 6.4 oz)   11/15/19 (!) 145 kg (320 lb)   07/10/19 (!) 145 kg (320 lb)     Initial weight: 344  Last OV weight: 344  Current weight: 339  Change in weight: -5      Patient denies personal and family history of  pancreatitis, thyroid cancer, MEN-2 tumors.  Denies any hx of glaucoma, seizures, kidney stones, gallstones.  Denies Hx of CAD, PAD, palpitations, arrhythmia.   Denies uncontrolled anxiety or depression, suicidal ideation or behavior, insomnia or sleep disturbance.     The following portions of the patient's history were reviewed and updated as appropriate: allergies, current medications, past family history, past medical history, past social history, past surgical history, and problem list.    Review Of Systems:  Review of Systems   Constitutional:  Negative for activity change, appetite change, fatigue and fever.   Respiratory:  Negative for cough and shortness of breath.    Cardiovascular:  Negative for chest pain, palpitations and leg swelling.   Gastrointestinal:  Negative for abdominal pain, constipation, diarrhea, nausea and vomiting.   Endocrine: Negative for cold intolerance and heat intolerance.   Genitourinary:  Negative for difficulty urinating and dysuria.   Musculoskeletal:  Negative for arthralgias, back pain and gait problem.   Skin:  Negative for pallor and rash.   Neurological:  Negative for headaches.   Psychiatric/Behavioral:  Negative for dysphoric mood, sleep disturbance and suicidal ideas (or HI). The patient is not nervous/anxious.        Objective:  /78 (BP Location: Right arm, Patient Position: Sitting)   Pulse 104   Resp 16   Ht 5' 5\" (1.651 m)   Wt (!) 154 kg (339 lb 3.2 oz)   BMI 56.45 kg/m²   Physical Exam  Vitals and nursing note " reviewed.   Constitutional:       General: She is not in acute distress.     Appearance: Normal appearance. She is not ill-appearing or diaphoretic.   Eyes:      General: No scleral icterus.  Cardiovascular:      Rate and Rhythm: Normal rate and regular rhythm.      Pulses: Normal pulses.      Heart sounds: No murmur heard.  Pulmonary:      Effort: Pulmonary effort is normal. No respiratory distress.      Breath sounds: Normal breath sounds. No wheezing or rhonchi.   Musculoskeletal:      Cervical back: Neck supple.      Right lower leg: No edema.      Left lower leg: No edema.   Lymphadenopathy:      Cervical: No cervical adenopathy.   Skin:     Capillary Refill: Capillary refill takes less than 2 seconds.      Findings: No lesion or rash.   Neurological:      Mental Status: She is alert and oriented to person, place, and time.      Gait: Gait normal.   Psychiatric:         Mood and Affect: Mood normal.         Behavior: Behavior normal.       Labs and Imaging  Recent labs and imaging have been personally reviewed.  Lab Results   Component Value Date    GWR3CAHFQAVL 2.055 12/15/2023   LIPID PANEL  Status: Edited Result - FINAL         suggestion  Result Information displayed in this report will not trend and may not trigger automated decision support.     LIPID PANEL  Order: [id number]  Component  Ref Range & Units 6/9/23 10:12 AM   Cholesterol  <200 mg/dL 179   Triglyceride  <150 mg/dL 111   Cholesterol, HDL, Direct  23 - 92 mg/dL 47   Cholesterol, Non-HDL  <160 mg/dL 132   Cholesterol, LDL, Calculated  <130 mg/dL 110   Comment: LDL Cholesterol was calculated using the Friedewald equation. Direct measurement of LDL is not indicated for this patient based on [facility]'s analytical algorithm for measurement of LDL Cholesterol.   CHOL/HDL Ratio 3.8       Specimen Collected: 06/09/23 10:12 AM   COMPREHENSIVE METABOLIC PANEL  Order: [id number]  Component  Ref Range & Units 6/9/23 10:12 AM   Glucose  65 - 99 mg/dL 73   BUN  7 -  25 mg/dL 12   Creatinine  0.40 - 1.10 mg/dL 0.61   Sodium  135 - 145 mmol/L 140   Potassium  3.5 - 5.2 mmol/L 4.2   Chloride  100 - 109 mmol/L 104   Carbon Dioxide  21 - 31 mmol/L 25   Calcium  8.5 - 10.1 mg/dL 9.0   Alkaline Phosphatase  35 - 120 U/L 53   Albumin  3.5 - 5.7 g/dL 4.2   Bilirubin, Total  0.2 - 1.0 mg/dL 0.5   Comment: Eltrombopag and its metabolites may interfere with this assay causing erroneously high patient results.   Protein, Total  6.3 - 8.3 g/dL 7.0   AST  <41 U/L 17   ALT  <56 U/L 17   Anion Gap  3 - 11 11   eGFRcr  >59 116   eGFRcr Comment Interpretive information: calculated GFR   CBC AND DIFFERENTIAL  Order: [id number]  Component  Ref Range & Units 6/9/23 10:12 AM   Hemoglobin  11.5 - 14.5 g/dL 13.0   Hematocrit  35.0 - 43.0 % 38.2   WBC  4.0 - 10.0 thou/cmm 8.5   RBC  3.70 - 4.70 mill/cmm 4.34   Platelet Count  140 - 350 thou/cmm 318   MPV  7.5 - 11.3 fL 9.1   MCV  80 - 100 fL 88   MCH  26.0 - 34.0 pg 29.9   MCHC  32.0 - 37.0 g/dL 34.0   RDW  12.0 - 16.0 % 13.7   Differential Type AUTO   Absolute Neutrophils  1.8 - 7.8 thou/cmm 5.5   Absolute Lymphocytes  1.0 - 3.0 thou/cmm 2.5   Absolute Monocytes  0.3 - 1.0 thou/cmm 0.3   Absolute Eosinophils  0.0 - 0.5 thou/cmm 0.1   Absolute Basophils  0.0 - 0.1 thou/cmm 0.1   Neutrophils  % 64   Lymphocytes  % 29   Monocytes  % 4   Eosinophils  % 2   Basophils  % 1   Poikilocytosis Rare   Stomatocytes Rare   Hematology Comment SEE NOTES     MUSE LINK     View MUSE Strips  ECG 12 lead  Status: Final result     ECG 12 lead  Order: [id number]  Status: Final result       Visible to patient: Yes (seen)       Next appt: 01/17/2024 at 11:45 AM in Bariatrics (Medical Weight Mgt Nurse)    0 Result Notes      Component  Ref Range & Units 12/15/23  8:59 AM   Ventricular Rate  BPM 85   Atrial Rate  BPM 85   NM Interval  ms 146   QRSD Interval  ms 86   QT Interval  ms 356   QTC Interval  ms 423   P [place]  degrees 40   QRS Axis  degrees 19   T Wave Axis  degrees 25               Narrative & Impression    Normal sinus rhythm  Normal ECG  No previous ECGs available  Confirmed by Cuong Garcia ([id number]) on 12/15/2023 9:26:28 AM      Specimen Collected: 12/15/23  8:59 AM Last Resulted: 12/15/23  9:26 AM

## 2023-12-20 NOTE — PATIENT INSTRUCTIONS
General Recommendations:  Nutrition:  Eat breakfast daily.  Do not skip meals.     Food log (ie.) www.[employer].com, sparkpeople.com, loseit.com, calorieking.com, etc.    Practice mindful eating.  Be sure to set aside time to eat, eat slowly, and savor your food.    Hydration:    At least 64oz of water daily.  No sugar sweetened beverages.  No juice (eat the fruit instead).    Exercise:  Studies have shown that the ideal exercise goal is somewhere between 150 to 300 minutes of moderate intensity exercise a week.  Start with exercising 10 minutes every other day and gradually increase physical activity with a goal of at least 150 minutes of moderate intensity exercise a week, divided over at least 3 days a week.  An example of this would be exercising 30 minutes a day, 5 days a week.  Resistance training can increase muscle mass and increase our resting metabolic rate.   FULL BODY resistance training is recommended 2-3 times a week.  Do not do this on consecutive days to allow for muscle recovery.    Aim for a bare minimum 5000 steps, even on days you do not exercise.    Monitoring:   Weigh yourself daily.  If this causes undue stress, then just weigh yourself once a week.  Weigh yourself the same time of the day with the same amount of clothing on.  Preferably this should be done after waking up, before you eat, and with no clothing or minimal clothing on.    Specific Goals:  Food log (ie.) www.[employer].com,sparkpeople.com,loseit.com,calorieking.com,etc.   Do this every single day for 1 week.  Then do this 1 day a week, every week.  Calorie goal:  6951-5423 calories a day.    No sugary beverages. At least 64oz of water daily.    Step count.  Aim for a minimum of 5000 step.     Phentermine 15mg daily.  Potential side effects of Phentermine: increased heart rate, increased blood pressure, palpitations, headache, dizziness, insomnia, altered mood, abdominal upset, and dry mouth. Notify the provider with  change in mood. Please go to the emergency room if you develop thoughts of harming yourself or others. Phentermine should be stopped 2 weeks prior to surgery. Notify the provider with any changes in vision.     After starting or increasing dose of phentermine it is advised that you check your resting blood pressure and pulse at least 3 times per week for a month.  Vary the time of day and record these results.  If you have any resting blood pressures above 140/90 or heart rate above 100 beats per minute, then you must notify your weight loss specialist and primary care doctor.    Nurse visit in 2-4 weeks.    Return visit:  3 months

## 2024-01-17 ENCOUNTER — CLINICAL SUPPORT (OUTPATIENT)
Dept: BARIATRICS | Facility: CLINIC | Age: 39
End: 2024-01-17

## 2024-01-17 VITALS
SYSTOLIC BLOOD PRESSURE: 125 MMHG | HEART RATE: 81 BPM | HEIGHT: 65 IN | WEIGHT: 293 LBS | BODY MASS INDEX: 48.82 KG/M2 | DIASTOLIC BLOOD PRESSURE: 80 MMHG | RESPIRATION RATE: 16 BRPM

## 2024-01-17 DIAGNOSIS — R63.5 ABNORMAL WEIGHT GAIN: Primary | ICD-10-CM

## 2024-01-17 PROCEDURE — RECHECK

## 2024-01-17 NOTE — PROGRESS NOTES
Patient last visit weight:  Patient current visit weight:    If you are taking phentermine or other oral weight loss medications, are you experiencing any of the following symptoms:  Headache:   Blurred Vision:   Chest Pain:   Palpitations:  Insomnia:   SPECIFY ORAL MEDICATION AND DOSAGE:     If you are taking an injectable medication,  are you experiencing any of the following symptoms:  Bloating:   Nausea:  Vomiting:   Constipation:   Diarrhea:  SPECIFY INJECTABLE MEDICATION AND CURRENT DOSAGE:  Pt is asking about medication to start    Vitals:    Is BP less than 100/60? NO  Is BP greater than 140/90? NO  Is HR greater than 100? NO  **If yes to any of the above, have patient relax and repeat in 5-10 minutes**    Repeat values:    Is BP less than 100/60?  Is BP greater than 140/90?  Is HR greater than 100?  **If values remain outside of ranges above, please consult provider for next steps**

## 2024-01-29 DIAGNOSIS — E66.01 OBESITY, MORBID, BMI 50 OR HIGHER (HCC): Primary | ICD-10-CM

## 2024-01-29 RX ORDER — PHENTERMINE HYDROCHLORIDE 15 MG/1
15 CAPSULE ORAL EVERY MORNING
Qty: 30 CAPSULE | Refills: 0 | Status: SHIPPED | OUTPATIENT
Start: 2024-01-29 | End: 2024-02-05 | Stop reason: SDUPTHER

## 2024-02-05 ENCOUNTER — TELEPHONE (OUTPATIENT)
Dept: BARIATRICS | Facility: CLINIC | Age: 39
End: 2024-02-05

## 2024-02-05 DIAGNOSIS — E66.01 OBESITY, MORBID, BMI 50 OR HIGHER (HCC): ICD-10-CM

## 2024-02-05 RX ORDER — PHENTERMINE HYDROCHLORIDE 15 MG/1
15 CAPSULE ORAL EVERY MORNING
Qty: 30 CAPSULE | Refills: 0 | Status: SHIPPED | OUTPATIENT
Start: 2024-02-05

## 2024-02-05 NOTE — TELEPHONE ENCOUNTER
----- Message from Sarah Ackerman sent at 2/5/2024  6:59 AM EST -----  Regarding: FW: Medication   Contact: [phone]    ----- Message -----  From: Kim Mojica  Sent: 2/2/2024   6:37 PM EST  To: [facility]  Subject: Medication                                       Thank you! Would you be able to change the pharmacy to [facility]?

## 2024-02-26 ENCOUNTER — TELEPHONE (OUTPATIENT)
Age: 39
End: 2024-02-26

## 2024-02-26 NOTE — TELEPHONE ENCOUNTER
Patient called in to cancel and r/s nursing appt on 2/27, tried to tx to clinic, no one was available.  Can someone please call her today at [phone] to get her rescheduled?  Thanks

## 2024-03-05 ENCOUNTER — CLINICAL SUPPORT (OUTPATIENT)
Dept: BARIATRICS | Facility: CLINIC | Age: 39
End: 2024-03-05

## 2024-03-05 VITALS
BODY MASS INDEX: 48.82 KG/M2 | RESPIRATION RATE: 16 BRPM | TEMPERATURE: 97.4 F | SYSTOLIC BLOOD PRESSURE: 128 MMHG | WEIGHT: 293 LBS | HEIGHT: 65 IN | HEART RATE: 100 BPM | DIASTOLIC BLOOD PRESSURE: 80 MMHG

## 2024-03-05 DIAGNOSIS — R63.5 ABNORMAL WEIGHT GAIN: Primary | ICD-10-CM

## 2024-03-05 PROCEDURE — RECHECK

## 2024-03-05 NOTE — PROGRESS NOTES
Patient last visit weight:339lb  Patient current visit weight:331lb    If you are taking phentermine or other oral weight loss medications, are you experiencing any of the following symptoms:  Headache:  No  Blurred Vision:  No  Chest Pain:  No  Palpitations: No  Insomnia:  No  SPECIFY ORAL MEDICATION AND DOSAGE: Phentermine    If you are taking an injectable medication,  are you experiencing any of the following symptoms:  Bloating:   Nausea:  Vomiting:   Constipation:   Diarrhea:  SPECIFY INJECTABLE MEDICATION AND CURRENT DOSAGE:      Vitals:    Is BP less than 100/60? No  Is BP greater than 140/90? No  Is HR greater than 100? No  **If yes to any of the above, have patient relax and repeat in 5-10 minutes**    Repeat values:    Is BP less than 100/60?  Is BP greater than 140/90?  Is HR greater than 100?  **If values remain outside of ranges above, please consult provider for next steps**

## 2024-04-02 ENCOUNTER — OFFICE VISIT (OUTPATIENT)
Dept: URGENT CARE | Facility: CLINIC | Age: 39
End: 2024-04-02
Payer: COMMERCIAL

## 2024-04-02 VITALS
WEIGHT: 293 LBS | SYSTOLIC BLOOD PRESSURE: 142 MMHG | RESPIRATION RATE: 20 BRPM | HEIGHT: 65 IN | BODY MASS INDEX: 48.82 KG/M2 | DIASTOLIC BLOOD PRESSURE: 82 MMHG | HEART RATE: 120 BPM | OXYGEN SATURATION: 97 % | TEMPERATURE: 97.7 F

## 2024-04-02 DIAGNOSIS — J02.0 STREP PHARYNGITIS: Primary | ICD-10-CM

## 2024-04-02 DIAGNOSIS — J20.9 ACUTE BRONCHITIS WITH WHEEZING: ICD-10-CM

## 2024-04-02 DIAGNOSIS — J02.9 ACUTE PHARYNGITIS, UNSPECIFIED ETIOLOGY: ICD-10-CM

## 2024-04-02 LAB — S PYO AG THROAT QL: POSITIVE

## 2024-04-02 PROCEDURE — 87880 STREP A ASSAY W/OPTIC: CPT | Performed by: NURSE PRACTITIONER

## 2024-04-02 PROCEDURE — 99214 OFFICE O/P EST MOD 30 MIN: CPT | Performed by: NURSE PRACTITIONER

## 2024-04-02 RX ORDER — PREDNISONE 10 MG/1
TABLET ORAL
Qty: 24 TABLET | Refills: 0 | Status: SHIPPED | OUTPATIENT
Start: 2024-04-02

## 2024-04-02 RX ORDER — ALBUTEROL SULFATE 90 UG/1
2 AEROSOL, METERED RESPIRATORY (INHALATION) EVERY 6 HOURS PRN
Qty: 8.5 G | Refills: 0 | Status: SHIPPED | OUTPATIENT
Start: 2024-04-02

## 2024-04-02 RX ORDER — AMOXICILLIN AND CLAVULANATE POTASSIUM 875; 125 MG/1; MG/1
1 TABLET, FILM COATED ORAL EVERY 12 HOURS SCHEDULED
Qty: 20 TABLET | Refills: 0 | Status: SHIPPED | OUTPATIENT
Start: 2024-04-02 | End: 2024-04-12

## 2024-04-02 NOTE — PROGRESS NOTES
[facility]        NAME: Kim Mojica is a 38 y.o. female  : 1985    MRN: [id number]  DATE: 2024  TIME: 9:40 AM    Assessment and Plan   Strep pharyngitis [J02.0]  1. Strep pharyngitis  amoxicillin-clavulanate (AUGMENTIN) 875-125 mg per tablet    predniSONE 10 mg tablet    albuterol (ProAir HFA) 90 mcg/act inhaler      2. Acute pharyngitis, unspecified etiology  POCT rapid strepA    amoxicillin-clavulanate (AUGMENTIN) 875-125 mg per tablet    predniSONE 10 mg tablet    albuterol (ProAir HFA) 90 mcg/act inhaler      3. Acute bronchitis with wheezing  amoxicillin-clavulanate (AUGMENTIN) 875-125 mg per tablet    predniSONE 10 mg tablet    albuterol (ProAir HFA) 90 mcg/act inhaler            Patient Instructions       Follow up with PCP in 3-5 days.  Proceed to  ER if symptoms worsen.    If tests have been performed at [facility], our office will contact you with results if changes need to be made to the care plan discussed with you at the visit.  You can review your full results on [facility] MyChart.      Your strep A is positive. You have been prescribed Augmentin.  You have been prescribed an antibiotic - you are to take an oral probiotic and eat yogurt to avoid GI issues/diarrhea.  You are to do warm salt water gargles 4 x daily.  Drink warm tea with honey and lemon.  Take tylenol or motrin as able for pain or fever.  Chloraseptic throat spray, cough drops.  Do not share utensils.  Change your tooth brush in 3 days.  Follow up with your PCP in 2-3 days  Go to the ED if symptoms worsen      You have been prescribed prednisone for wheezing.  You have been prescribed prednisone. Take with food. Do NOT take any NSAID products (motrin, ibuprofen, aleve, advil) while taking this medication.  You may take tylenol.   You have been prescribed a rescue inhaler for shortness of breath or wheezing.    Drink plenty of water.  You may continue the OTC cough, cold congestion products for your symptoms if  needed.        Chief Complaint     Chief Complaint   Patient presents with    Sore Throat         History of Present Illness       This is a 38 year old female who states started with cold like symptoms several days ago and now has a sorethroat.  + subjective fevers, chills.  Denies n/v/d, pregnancy. She states she has been taking dayquil and nyquil w/o relief.   PMH is listed.      Sore Throat   Associated symptoms include congestion and coughing.       Review of Systems   Review of Systems   Constitutional:  Positive for chills and fever.   HENT:  Positive for congestion and sore throat.    Eyes: Negative.    Respiratory:  Positive for cough and wheezing.    Cardiovascular: Negative.    Gastrointestinal: Negative.    Endocrine: Negative.    Genitourinary: Negative.    Musculoskeletal: Negative.    Skin: Negative.    Allergic/Immunologic: Negative.    Neurological: Negative.    Hematological: Negative.    Psychiatric/Behavioral: Negative.           Current Medications       Current Outpatient Medications:     albuterol (ProAir HFA) 90 mcg/act inhaler, Inhale 2 puffs every 6 (six) hours as needed for wheezing or shortness of breath, Disp: 8.5 g, Rfl: 0    amoxicillin-clavulanate (AUGMENTIN) 875-125 mg per tablet, Take 1 tablet by mouth every 12 (twelve) hours for 10 days, Disp: 20 tablet, Rfl: 0    phentermine 15 MG capsule, Take 1 capsule (15 mg total) by mouth every morning, Disp: 30 capsule, Rfl: 0    predniSONE 10 mg tablet, Take 5 tabs po x 2 days; 4 tabs po x 2 days; 3 tabs po x 1 day; 2 tabs po x 1 day. 1 tab po x 1 day., Disp: 24 tablet, Rfl: 0    ROCÍO FE 1.5/30 1.5-30 MG-MCG tablet, , Disp: , Rfl: 0    Current Allergies     Allergies as of 04/02/2024 - Reviewed 04/02/2024   Allergen Reaction Noted    Hydrocodone Other (See Comments) 04/21/2018            The following portions of the patient's history were reviewed and updated as appropriate: allergies, current medications, past family history, past  "medical history, past social history, past surgical history and problem list.     History reviewed. No pertinent past medical history.    Past Surgical History:   Procedure Laterality Date    ANKLE SURGERY         History reviewed. No pertinent family history.      Medications have been verified.        Objective   /82   Pulse (!) 120   Temp 97.7 °F (36.5 °C)   Resp 20   Ht 5' 5\" (1.651 m)   Wt (!) 150 kg (330 lb)   SpO2 97%   BMI 54.91 kg/m²   No LMP recorded. (Menstrual status: Birth Control).       Physical Exam     Physical Exam  Vitals and nursing note reviewed.   Constitutional:       General: She is not in acute distress.     Appearance: She is well-developed. She is obese. She is not ill-appearing, toxic-appearing or diaphoretic.   HENT:      Head: Normocephalic and atraumatic.      Right Ear: Tympanic membrane and ear canal normal.      Left Ear: Tympanic membrane and ear canal normal.      Nose: Congestion present. No rhinorrhea.      Mouth/Throat:      Mouth: Mucous membranes are moist. No oral lesions.      Pharynx: Uvula midline. Pharyngeal swelling, posterior oropharyngeal erythema and uvula swelling present. No oropharyngeal exudate.      Tonsils: No tonsillar exudate or tonsillar abscesses. 2+ on the right. 2+ on the left.   Eyes:      Extraocular Movements:      Right eye: Normal extraocular motion.      Left eye: Normal extraocular motion.   Cardiovascular:      Rate and Rhythm: Normal rate and regular rhythm.      Heart sounds: Normal heart sounds. No murmur heard.  Pulmonary:      Effort: Pulmonary effort is normal. No respiratory distress.      Breath sounds: No stridor. Wheezing and rhonchi present. No rales.   Chest:      Chest wall: No tenderness.   Musculoskeletal:      Cervical back: Normal range of motion and neck supple.   Lymphadenopathy:      Cervical: No cervical adenopathy.   Skin:     General: Skin is warm and dry.      Capillary Refill: Capillary refill takes less than " 2 seconds.   Neurological:      General: No focal deficit present.      Mental Status: She is alert and oriented to person, place, and time.   Psychiatric:         Mood and Affect: Mood normal.         Behavior: Behavior normal.

## 2024-04-02 NOTE — PATIENT INSTRUCTIONS
Your strep A is positive. You have been prescribed Augmentin.  You have been prescribed an antibiotic - you are to take an oral probiotic and eat yogurt to avoid GI issues/diarrhea.  You are to do warm salt water gargles 4 x daily.  Drink warm tea with honey and lemon.  Take tylenol or motrin as able for pain or fever.  Chloraseptic throat spray, cough drops.  Do not share utensils.  Change your tooth brush in 3 days.  Follow up with your PCP in 2-3 days  Go to the ED if symptoms worsen      You have been prescribed prednisone for wheezing.  You have been prescribed prednisone. Take with food. Do NOT take any NSAID products (motrin, ibuprofen, aleve, advil) while taking this medication.  You may take tylenol.   You have been prescribed a rescue inhaler for shortness of breath or wheezing.    Drink plenty of water.  You may continue the OTC cough, cold congestion products for your symptoms if needed.

## 2024-04-02 NOTE — LETTER
April 2, 2024     Patient: Kim Mojica   YOB: 1985   Date of Visit: 4/2/2024       To Whom It May Concern:    It is my medical opinion that Kim Mojica may return to work on 4/3/2024 .    If you have any questions or concerns, please don't hesitate to call.         Sincerely,        ALEXUS Magana    CC: No Recipients

## 2024-04-18 ENCOUNTER — OFFICE VISIT (OUTPATIENT)
Dept: BARIATRICS | Facility: CLINIC | Age: 39
End: 2024-04-18
Payer: COMMERCIAL

## 2024-04-18 VITALS
SYSTOLIC BLOOD PRESSURE: 130 MMHG | TEMPERATURE: 97.9 F | RESPIRATION RATE: 16 BRPM | WEIGHT: 293 LBS | HEIGHT: 65 IN | DIASTOLIC BLOOD PRESSURE: 82 MMHG | HEART RATE: 110 BPM | BODY MASS INDEX: 48.82 KG/M2

## 2024-04-18 DIAGNOSIS — E66.01 OBESITY, MORBID, BMI 50 OR HIGHER (HCC): Primary | ICD-10-CM

## 2024-04-18 PROCEDURE — 99213 OFFICE O/P EST LOW 20 MIN: CPT | Performed by: INTERNAL MEDICINE

## 2024-04-18 RX ORDER — PHENTERMINE HYDROCHLORIDE 30 MG/1
30 CAPSULE ORAL EVERY MORNING
Qty: 30 CAPSULE | Refills: 0 | Status: SHIPPED | OUTPATIENT
Start: 2024-04-18

## 2024-04-18 RX ORDER — PHENTERMINE HYDROCHLORIDE 30 MG/1
30 CAPSULE ORAL EVERY MORNING
Qty: 30 CAPSULE | Refills: 0 | Status: SHIPPED | OUTPATIENT
Start: 2024-04-18 | End: 2024-04-18

## 2024-04-18 NOTE — PROGRESS NOTES
Assessment/Plan:  Kim was seen today for follow-up.    Diagnoses and all orders for this visit:    Obesity, morbid, BMI 50 or higher ([facility])  -     Discontinue: phentermine 30 MG capsule; Take 1 capsule (30 mg total) by mouth every morning  -     phentermine 30 MG capsule; Take 1 capsule (30 mg total) by mouth every morning      Initial weight: 344  Last OV weight: 339 (phentermine start 2/2023)  Current weight: 328  Change in weight: -11lbs    - Weight not at goal  - Patient is interested in Conservative Program  - Labs reviewed: As below.  -BP: controlled  Pulse: controlled  Contraception: patient advised of potential harm to fetus if pregnancy occurs, advised contraceptive methods.  No blurry vision, depression or anxiety, no insomnia, no heart palpitations or chest pain.  PDMP reviewed.  No evidence or suspicion of abuse or diversion.  Patient understood all instructions and agrees to continue the medications      General Recommendations:  Nutrition:  Eat breakfast daily.  Do not skip meals.     Food log (ie.) www.Settle.com, sparkpeople.com, loseit.com, calorieking.com, etc.    Practice mindful eating.  Be sure to set aside time to eat, eat slowly, and savor your food.    Hydration:    At least 64oz of water daily.  No sugar sweetened beverages.  No juice (eat the fruit instead).    Exercise:  Studies have shown that the ideal exercise goal is somewhere between 150 to 300 minutes of moderate intensity exercise a week.  Start with exercising 10 minutes every other day and gradually increase physical activity with a goal of at least 150 minutes of moderate intensity exercise a week, divided over at least 3 days a week.  An example of this would be exercising 30 minutes a day, 5 days a week.  Resistance training can increase muscle mass and increase our resting metabolic rate.   FULL BODY resistance training is recommended 2-3 times a week.  Do not do this on consecutive days to allow for muscle  recovery.    Aim for a bare minimum 5000 steps, even on days you do not exercise.    Monitoring:   Weigh yourself daily.  If this causes undue stress, then just weigh yourself once a week.  Weigh yourself the same time of the day with the same amount of clothing on.  Preferably this should be done after waking up, before you eat, and with no clothing or minimal clothing on.    Specific Goals:  Food log (ie.) www.Carbay.com,sparkpeople.com,[employer].com,Oktogo.com,etc.   No sugary beverages. At least 64oz of water daily.  Gradually increase physical activity to a goal of 5 days per week for 30 minutes of MODERATE intensity PLUS 2 days per week of FULL BODY resistance training    Increase phentermine to 30mg daily.  Potential side effects of Phentermine: increased heart rate, increased blood pressure, palpitations, headache, dizziness, insomnia, altered mood, abdominal upset, and dry mouth. Notify the provider with change in mood. Please go to the emergency room if you develop thoughts of harming yourself or others. Phentermine should be stopped 2 weeks prior to surgery. Notify the provider with any changes in vision.     After starting or increasing dose of phentermine it is advised that you check your resting blood pressure and pulse at least 3 times per week for a month.  Vary the time of day and record these results.  If you have any resting blood pressures above 140/90 or heart rate above 100 beats per minute, then you must notify your weight loss specialist and primary care doctor.    Nurse visit in 4 week.    Return visit:  3 month.    Total time spent reviewing chart, interviewing patient, examining patient, discussing plan, answering all questions, and documentin min.       ______________________________________________________________________      Subjective:   Chief Complaint   Patient presents with   • Follow-up     MWM- 4mth f/u, Waist 54.5in     Patient here to discuss weight associated  "problems and nutrition goals  HPI: Kim Mojica  is a 38 y.o. female with history of  and excess weight.  Weight loss plan:  Conservative Program.   Most recent notes and records were reviewed.    Wt Readings from Last 10 Encounters:   04/18/24 (!) 149 kg (328 lb 9.6 oz)   04/02/24 (!) 150 kg (330 lb)   03/05/24 (!) 150 kg (331 lb)   01/17/24 (!) 152 kg (336 lb)   12/20/23 (!) 154 kg (339 lb 3.2 oz)   09/22/23 (!) 151 kg (333 lb)   08/17/23 (!) 156 kg (344 lb 12.8 oz)   11/22/22 (!) 154 kg (340 lb 6.4 oz)   09/30/20 (!) 152 kg (335 lb)   05/06/20 (!) 145 kg (320 lb 6.4 oz)       Initial weight: 344  Last OV weight: 339 (phentermine start 2/2023)  Current weight: 328  Change in weight: -11lbs    Reports reduced appetite \"food noise.\"      Patient denies personal and family history of  pancreatitis, thyroid cancer, MEN-2 tumors.  Denies any hx of glaucoma, seizures, kidney stones, gallstones.  Denies Hx of CAD, PAD, palpitations, arrhythmia.   Denies uncontrolled anxiety or depression, suicidal ideation or behavior, insomnia or sleep disturbance.     The following portions of the patient's history were reviewed and updated as appropriate: allergies, current medications, past family history, past medical history, past social history, past surgical history, and problem list.    Review Of Systems:  Review of Systems   Constitutional:  Negative for activity change, appetite change, fatigue and fever.   Respiratory:  Negative for cough and shortness of breath.    Cardiovascular:  Negative for chest pain, palpitations and leg swelling.   Gastrointestinal:  Negative for abdominal pain, constipation, diarrhea, nausea and vomiting.   Endocrine: Negative for cold intolerance and heat intolerance.   Genitourinary:  Negative for difficulty urinating and dysuria.   Musculoskeletal:  Negative for arthralgias, back pain and gait problem.   Skin:  Negative for pallor and rash.   Neurological:  Negative for headaches. " "  Psychiatric/Behavioral:  Negative for dysphoric mood, sleep disturbance and suicidal ideas (or HI). The patient is not nervous/anxious.        Objective:  /82   Pulse (!) 110   Temp 97.9 °F (36.6 °C)   Resp 16   Ht 5' 5\" (1.651 m)   Wt (!) 149 kg (328 lb 9.6 oz)   BMI 54.68 kg/m²   Physical Exam  Vitals and nursing note reviewed.   Constitutional:       General: She is not in acute distress.     Appearance: Normal appearance. She is not ill-appearing or diaphoretic.   Eyes:      General: No scleral icterus.  Cardiovascular:      Rate and Rhythm: Normal rate and regular rhythm.      Pulses: Normal pulses.      Heart sounds: No murmur heard.  Pulmonary:      Effort: Pulmonary effort is normal. No respiratory distress.      Breath sounds: Normal breath sounds. No wheezing or rhonchi.   Musculoskeletal:      Cervical back: Neck supple.      Right lower leg: No edema.      Left lower leg: No edema.   Lymphadenopathy:      Cervical: No cervical adenopathy.   Skin:     Capillary Refill: Capillary refill takes less than 2 seconds.      Findings: No lesion or rash.   Neurological:      Mental Status: She is alert and oriented to person, place, and time.      Gait: Gait normal.   Psychiatric:         Mood and Affect: Mood normal.         Behavior: Behavior normal.     Labs and Imaging  Recent labs and imaging have been personally reviewed.  CBC AND DIFFERENTIAL  Status: Edited Result - FINAL         suggestion  Result Information displayed in this report will not trend and may not trigger automated decision support.     CBC AND DIFFERENTIAL  Order: [id number]  Component  Ref Range & Units 6/9/23 10:12 AM   Hemoglobin  11.5 - 14.5 g/dL 13.0   Hematocrit  35.0 - 43.0 % 38.2   WBC  4.0 - 10.0 thou/cmm 8.5   RBC  3.70 - 4.70 mill/cmm 4.34   Platelet  140 - 350 thou/cmm 318   MPV  7.5 - 11.3 fL 9.1   MCV  80 - 100 fL 88   MCH  26.0 - 34.0 pg 29.9   MCHC  32.0 - 37.0 g/dL 34.0   RDW  12.0 - 16.0 % 13.7   Differential " "Type AUTO   Absolute Neutrophils  1.8 - 7.8 thou/cmm 5.5   Absolute Lymphocytes  1.0 - 3.0 thou/cmm 2.5   Absolute Monocytes  0.3 - 1.0 thou/cmm 0.3   Absolute Eosinophils  0.0 - 0.5 thou/cmm 0.1   Absolute Basophils  0.0 - 0.1 thou/cmm 0.1   Neutrophils  % 64   Lymphocytes Manual  % 29   Monocytes  % 4   Eosinophils  % 2   Basophils  % 1   Poikilocytosis Rare   Stomatocytes Rare   Hematology Comment SEE NOTES     COMPREHENSIVE METABOLIC PANEL  Order: [id number]   Status: Edited Result - FINAL       Next appt: 05/03/2024 at 08:00 AM in Obstetrics and Gynecology (Lane Francisco MD)        Component  Ref Range & Units 6/9/23 10:12 AM   Glucose  65 - 99 mg/dL 73   BUN  7 - 25 mg/dL 12   Creatinine  0.40 - 1.10 mg/dL 0.61   Sodium  135 - 145 mmol/L 140   Potassium  3.5 - 5.2 mmol/L 4.2   Chloride  100 - 109 mmol/L 104   Carbon Dioxide  21 - 31 mmol/L 25   Calcium  8.5 - 10.1 mg/dL 9.0   Alkaline Phosphatase  35 - 120 U/L 53   ALBUMIN  3.5 - 5.7 g/dL 4.2   Total Bilirubin  0.2 - 1.0 mg/dL 0.5   Comment: Eltrombopag and its metabolites may interfere with this assay causing erroneously high patient results.   Protein, Total  6.3 - 8.3 g/dL 7.0   AST  <41 U/L 17   ALT  <56 U/L 17   ANION GAP  3 - 11 11   eGFRcr  >59 116        LIPID PANEL  Order: [id number]  Component  Ref Range & Units 6/9/23 10:12 AM   Cholesterol  <200 mg/dL 179   Triglycerides  <150 mg/dL 111   Cholesterol, HDL, Direct  23 - 92 mg/dL 47   Cholesterol, Non-HDL  <160 mg/dL 132   LDL Cholesterol  <130 mg/dL 110   Comment: LDL Cholesterol was calculated using the Friedewald equation. Direct measurement of LDL is not indicated for this patient based on [facility]'s analytical algorithm for measurement of LDL Cholesterol.   Chol/HDL Ratio 3.8       No results found for: \"HGBA1C\"  Lab Results   Component Value Date    ZSZ8DOFJTMYB 2.055 12/15/2023     ECG 12 lead  Status: Final result     ECG 12 lead  Order: [id number]   Status: Final result       Visible to patient: " Yes (seen)       Next appt: 05/03/2024 at 08:00 AM in Obstetrics and Gynecology (Lane Francisco MD)    0 Result Notes      Component  Ref Range & Units 12/15/23  8:59 AM   Ventricular Rate  BPM 85   Atrial Rate  BPM 85   PA Interval  ms 146   QRSD Interval  ms 86   QT Interval  ms 356   QTC Interval  ms 423   P [place]  degrees 40   QRS Axis  degrees 19   T Wave Axis  degrees 25              Narrative & Impression    Normal sinus rhythm  Normal ECG  No previous ECGs available  Confirmed by Cuong Garcia ([id number]) on 12/15/2023 9:26:28 AM

## 2024-04-18 NOTE — PATIENT INSTRUCTIONS
General Recommendations:  Nutrition:  Eat breakfast daily.  Do not skip meals.     Food log (ie.) www.Arigo.com, sparkpeople.com, loseit.com, calorieking.com, etc.    Practice mindful eating.  Be sure to set aside time to eat, eat slowly, and savor your food.    Hydration:    At least 64oz of water daily.  No sugar sweetened beverages.  No juice (eat the fruit instead).    Exercise:  Studies have shown that the ideal exercise goal is somewhere between 150 to 300 minutes of moderate intensity exercise a week.  Start with exercising 10 minutes every other day and gradually increase physical activity with a goal of at least 150 minutes of moderate intensity exercise a week, divided over at least 3 days a week.  An example of this would be exercising 30 minutes a day, 5 days a week.  Resistance training can increase muscle mass and increase our resting metabolic rate.   FULL BODY resistance training is recommended 2-3 times a week.  Do not do this on consecutive days to allow for muscle recovery.    Aim for a bare minimum 5000 steps, even on days you do not exercise.    Monitoring:   Weigh yourself daily.  If this causes undue stress, then just weigh yourself once a week.  Weigh yourself the same time of the day with the same amount of clothing on.  Preferably this should be done after waking up, before you eat, and with no clothing or minimal clothing on.    Specific Goals:  Food log (ie.) www.Arigo.com,sparkpeople.com,loseit.com,calorieking.com,etc.   No sugary beverages. At least 64oz of water daily.  Gradually increase physical activity to a goal of 5 days per week for 30 minutes of MODERATE intensity PLUS 2 days per week of FULL BODY resistance training    Increase phentermine to 30mg daily.  Potential side effects of Phentermine: increased heart rate, increased blood pressure, palpitations, headache, dizziness, insomnia, altered mood, abdominal upset, and dry mouth. Notify the provider with change  in mood. Please go to the emergency room if you develop thoughts of harming yourself or others. Phentermine should be stopped 2 weeks prior to surgery. Notify the provider with any changes in vision.     After starting or increasing dose of phentermine it is advised that you check your resting blood pressure and pulse at least 3 times per week for a month.  Vary the time of day and record these results.  If you have any resting blood pressures above 140/90 or heart rate above 100 beats per minute, then you must notify your weight loss specialist and primary care doctor.    Nurse visit in 4 week.    Return visit:  3 month.

## 2024-05-21 ENCOUNTER — TELEPHONE (OUTPATIENT)
Dept: BARIATRICS | Facility: CLINIC | Age: 39
End: 2024-05-21

## 2024-05-21 ENCOUNTER — CLINICAL SUPPORT (OUTPATIENT)
Dept: BARIATRICS | Facility: CLINIC | Age: 39
End: 2024-05-21

## 2024-05-21 VITALS
SYSTOLIC BLOOD PRESSURE: 128 MMHG | RESPIRATION RATE: 16 BRPM | WEIGHT: 293 LBS | DIASTOLIC BLOOD PRESSURE: 70 MMHG | HEIGHT: 65 IN | BODY MASS INDEX: 48.82 KG/M2 | HEART RATE: 95 BPM | TEMPERATURE: 98.2 F

## 2024-05-21 DIAGNOSIS — E66.01 OBESITY, MORBID, BMI 50 OR HIGHER (HCC): Primary | ICD-10-CM

## 2024-05-21 DIAGNOSIS — R63.5 ABNORMAL WEIGHT GAIN: Primary | ICD-10-CM

## 2024-05-21 PROCEDURE — RECHECK

## 2024-05-21 RX ORDER — PHENTERMINE HYDROCHLORIDE 37.5 MG/1
37.5 TABLET ORAL DAILY
Qty: 30 TABLET | Refills: 0 | Status: SHIPPED | OUTPATIENT
Start: 2024-05-21

## 2024-05-21 NOTE — PROGRESS NOTES
Patient last visit weight:328lb 9.6oz  Patient current visit weight: 322.8lb    If you are taking phentermine or other oral weight loss medications, are you experiencing any of the following symptoms:  Headache: NO  Blurred Vision: NO  Chest Pain: NO  Palpitations:NO  Insomnia: NO  SPECIFY ORAL MEDICATION AND DOSAGE: PHENTERMINE    If you are taking an injectable medication,  are you experiencing any of the following symptoms:  Bloating:   Nausea:  Vomiting:   Constipation:   Diarrhea:  SPECIFY INJECTABLE MEDICATION AND CURRENT DOSAGE:      Vitals:    Is BP less than 100/60?NO  Is BP greater than 140/90?NO  Is HR greater than 100?NO  **If yes to any of the above, have patient relax and repeat in 5-10 minutes**    Repeat values:    Is BP less than 100/60?  Is BP greater than 140/90?  Is HR greater than 100?  **If values remain outside of ranges above, please consult provider for next steps**

## 2024-05-21 NOTE — TELEPHONE ENCOUNTER
Patient came in for a nurse visit today. Patient requesting a dose increase of her phentermine to be sent to [facility] pharmacy on file already. Patient tolerated previous dose without any incident.  
paced rhythm

## 2024-05-28 ENCOUNTER — OFFICE VISIT (OUTPATIENT)
Dept: OBGYN CLINIC | Facility: MEDICAL CENTER | Age: 39
End: 2024-05-28
Payer: COMMERCIAL

## 2024-05-28 VITALS
SYSTOLIC BLOOD PRESSURE: 130 MMHG | WEIGHT: 293 LBS | DIASTOLIC BLOOD PRESSURE: 80 MMHG | HEIGHT: 65 IN | BODY MASS INDEX: 48.82 KG/M2

## 2024-05-28 DIAGNOSIS — Z91.89 AT HIGH RISK FOR BREAST CANCER: ICD-10-CM

## 2024-05-28 DIAGNOSIS — Z30.41 ENCOUNTER FOR SURVEILLANCE OF CONTRACEPTIVE PILLS: ICD-10-CM

## 2024-05-28 DIAGNOSIS — Z12.4 PAP SMEAR FOR CERVICAL CANCER SCREENING: Primary | ICD-10-CM

## 2024-05-28 PROCEDURE — G0145 SCR C/V CYTO,THINLAYER,RESCR: HCPCS | Performed by: STUDENT IN AN ORGANIZED HEALTH CARE EDUCATION/TRAINING PROGRAM

## 2024-05-28 PROCEDURE — G0124 SCREEN C/V THIN LAYER BY MD: HCPCS | Performed by: STUDENT IN AN ORGANIZED HEALTH CARE EDUCATION/TRAINING PROGRAM

## 2024-05-28 PROCEDURE — S0610 ANNUAL GYNECOLOGICAL EXAMINA: HCPCS | Performed by: OBSTETRICS & GYNECOLOGY

## 2024-05-28 PROCEDURE — G0476 HPV COMBO ASSAY CA SCREEN: HCPCS | Performed by: OBSTETRICS & GYNECOLOGY

## 2024-05-28 RX ORDER — NORETHINDRONE ACETATE/ETHINYL ESTRADIOL AND FERROUS FUMARATE 1.5-30(21)
1 KIT ORAL DAILY
Qty: 90 TABLET | Refills: 3 | Status: SHIPPED | OUTPATIENT
Start: 2024-05-28 | End: 2024-05-31 | Stop reason: SDUPTHER

## 2024-05-28 NOTE — PROGRESS NOTES
A/P    1.  Annual exam    Last PAP  8/7/2018- neg neg   Next due today with co testing    Scheduling of pap discussed in detail      Mammogram - last 5/9/24- # 1  20 % risk    Next do recommend to get ABUS    Self breast exams discussed     Colonoscopy - @43    2.  Birthh control refill       38 y.o.,No LMP recorded. (Menstrual status: Birth Control).  C/O no gyn concerns refill of her birth control       Past medical / social / surgical / family history reviewed and updated   Medication and allergies discussed in detail and updated     Review of Systems - History obtained from chart review and the patient  General ROS: negative  Psychological ROS: negative  Ophthalmic ROS: negative  ENT ROS: negative  Allergy and Immunology ROS: negative  Hematological and Lymphatic ROS: negative  Endocrine ROS: negative  Breast ROS: negative for breast lumps  Respiratory ROS: no cough, shortness of breath, or wheezing  Cardiovascular ROS: no chest pain or dyspnea on exertion  Gastrointestinal ROS: no abdominal pain, change in bowel habits, or black or bloody stools  Genito-Urinary ROS: no dysuria, trouble voiding, or hematuria  Musculoskeletal ROS: negative  Neurological ROS: no TIA or stroke symptoms  Dermatological ROS: negative        Physical Exam  Vitals reviewed. Exam conducted with a chaperone present.   Constitutional:       Appearance: She is well-developed.   Neck:      Thyroid: No thyromegaly.   Cardiovascular:      Rate and Rhythm: Normal rate.      Heart sounds: Normal heart sounds.   Pulmonary:      Effort: Pulmonary effort is normal. No accessory muscle usage or respiratory distress.      Breath sounds: Normal air entry.   Chest:      Chest wall: No tenderness.   Breasts:     Breasts are symmetrical.      Right: No inverted nipple, mass or tenderness.      Left: No inverted nipple, mass or tenderness.   Abdominal:      General: There is no distension.      Palpations: Abdomen is soft.      Tenderness: There is no  abdominal tenderness. There is no right CVA tenderness, left CVA tenderness, guarding or rebound.   Genitourinary:     General: Normal vulva.      Exam position: Lithotomy position.      Labia:         Right: No rash, tenderness, lesion or injury.         Left: No rash, tenderness, lesion or injury.       Vagina: Normal. No foreign body. No vaginal discharge or bleeding.      Cervix: Normal.      Uterus: Normal. Not enlarged and not fixed.       Adnexa: Right adnexa normal and left adnexa normal.        Right: No mass, tenderness or fullness.          Left: No mass, tenderness or fullness.        Rectum: No external hemorrhoid.   Musculoskeletal:      Cervical back: Normal range of motion.   Lymphadenopathy:      Cervical: No cervical adenopathy.      Upper Body:      Right upper body: No supraclavicular adenopathy.      Left upper body: No supraclavicular adenopathy.   Neurological:      Mental Status: She is alert and oriented to person, place, and time.   Psychiatric:         Speech: Speech normal.         Behavior: Behavior normal.         Thought Content: Thought content normal.         Judgment: Judgment normal.

## 2024-05-29 LAB
HPV HR 12 DNA CVX QL NAA+PROBE: NEGATIVE
HPV16 DNA CVX QL NAA+PROBE: NEGATIVE
HPV18 DNA CVX QL NAA+PROBE: NEGATIVE

## 2024-05-31 DIAGNOSIS — Z30.41 ENCOUNTER FOR SURVEILLANCE OF CONTRACEPTIVE PILLS: ICD-10-CM

## 2024-06-02 DIAGNOSIS — Z30.41 ENCOUNTER FOR SURVEILLANCE OF CONTRACEPTIVE PILLS: ICD-10-CM

## 2024-06-02 RX ORDER — NORETHINDRONE ACETATE/ETHINYL ESTRADIOL AND FERROUS FUMARATE 1.5-30(21)
1 KIT ORAL DAILY
Qty: 90 TABLET | Refills: 3 | Status: SHIPPED | OUTPATIENT
Start: 2024-06-02 | End: 2024-06-02 | Stop reason: SDUPTHER

## 2024-06-03 RX ORDER — NORETHINDRONE ACETATE/ETHINYL ESTRADIOL AND FERROUS FUMARATE 1.5-30(21)
1 KIT ORAL DAILY
Qty: 90 TABLET | Refills: 3 | Status: SHIPPED | OUTPATIENT
Start: 2024-06-03 | End: 2025-05-29

## 2024-06-05 LAB
LAB AP GYN PRIMARY INTERPRETATION: ABNORMAL
Lab: ABNORMAL
PATH INTERP SPEC-IMP: ABNORMAL

## 2024-06-12 DIAGNOSIS — Z30.018 ENCOUNTER FOR INITIAL PRESCRIPTION OF OTHER CONTRACEPTIVES: Primary | ICD-10-CM

## 2024-06-12 DIAGNOSIS — Z30.41 ENCOUNTER FOR SURVEILLANCE OF CONTRACEPTIVE PILLS: ICD-10-CM

## 2024-06-12 RX ORDER — NORETHINDRONE ACETATE AND ETHINYL ESTRADIOL .03; 1.5 MG/1; MG/1
1 TABLET ORAL DAILY
Qty: 84 TABLET | Refills: 3 | Status: SHIPPED | OUTPATIENT
Start: 2024-06-12

## 2024-06-13 DIAGNOSIS — Z30.41 ENCOUNTER FOR SURVEILLANCE OF CONTRACEPTIVE PILLS: Primary | ICD-10-CM

## 2024-06-13 RX ORDER — LEVONORGESTREL AND ETHINYL ESTRADIOL 0.15-0.03
KIT ORAL
Refills: 0 | OUTPATIENT
Start: 2024-06-13

## 2024-06-13 RX ORDER — NORETHINDRONE ACETATE AND ETHINYL ESTRADIOL 1.5-30(21)
1 KIT ORAL DAILY
Qty: 90 TABLET | Refills: 3 | Status: SHIPPED | OUTPATIENT
Start: 2024-06-13

## 2024-06-19 DIAGNOSIS — E66.01 OBESITY, MORBID, BMI 50 OR HIGHER (HCC): ICD-10-CM

## 2024-06-19 RX ORDER — PHENTERMINE HYDROCHLORIDE 37.5 MG/1
37.5 TABLET ORAL DAILY
Qty: 30 TABLET | Refills: 0 | Status: SHIPPED | OUTPATIENT
Start: 2024-06-19

## 2024-06-19 NOTE — TELEPHONE ENCOUNTER
Refill must be reviewed and completed by the office or provider. The refill is unable to be approved or denied by the medication management team. Cannot be delegated

## 2024-07-24 DIAGNOSIS — E66.01 OBESITY, MORBID, BMI 50 OR HIGHER (HCC): ICD-10-CM

## 2024-07-24 NOTE — TELEPHONE ENCOUNTER
Refill must be reviewed and completed by the office or provider. The refill is unable to be approved or denied by the medication management team.    Refill can not be delegated

## 2024-07-25 RX ORDER — PHENTERMINE HYDROCHLORIDE 37.5 MG/1
37.5 TABLET ORAL DAILY
Qty: 30 TABLET | Refills: 0 | Status: SHIPPED | OUTPATIENT
Start: 2024-07-25

## 2024-08-22 ENCOUNTER — OFFICE VISIT (OUTPATIENT)
Dept: BARIATRICS | Facility: CLINIC | Age: 39
End: 2024-08-22
Payer: COMMERCIAL

## 2024-08-22 VITALS
RESPIRATION RATE: 17 BRPM | HEART RATE: 99 BPM | WEIGHT: 293 LBS | SYSTOLIC BLOOD PRESSURE: 125 MMHG | BODY MASS INDEX: 48.82 KG/M2 | TEMPERATURE: 98.7 F | HEIGHT: 65 IN | DIASTOLIC BLOOD PRESSURE: 75 MMHG

## 2024-08-22 DIAGNOSIS — E66.01 OBESITY, MORBID, BMI 50 OR HIGHER (HCC): Primary | ICD-10-CM

## 2024-08-22 PROCEDURE — 99213 OFFICE O/P EST LOW 20 MIN: CPT | Performed by: INTERNAL MEDICINE

## 2024-08-22 RX ORDER — PHENTERMINE HYDROCHLORIDE 37.5 MG/1
37.5 TABLET ORAL DAILY
Qty: 30 TABLET | Refills: 0 | Status: SHIPPED | OUTPATIENT
Start: 2024-08-22

## 2024-08-22 NOTE — PATIENT INSTRUCTIONS
nitial weight: 344  Phentermine start: 339 (2/2024)  Last OV weight: 328  Current weight: 301  Change in weight: -38lbs (11.2%)    - Weight not at goal  - Patient is interested in Conservative Program  - Labs reviewed: As below.    General Recommendations:  Nutrition:  Eat breakfast daily.  Do not skip meals.     Food log (ie.) www.myfitnesspal.com, sparkpeople.com, loseit.com, calorieking.com, etc.    Practice mindful eating.  Be sure to set aside time to eat, eat slowly, and savor your food.    Hydration:    At least 64oz of water daily.  No sugar sweetened beverages.  No juice (eat the fruit instead).    Exercise:  Studies have shown that the ideal exercise goal is somewhere between 150 to 300 minutes of moderate intensity exercise a week.  Start with exercising 10 minutes every other day and gradually increase physical activity with a goal of at least 150 minutes of moderate intensity exercise a week, divided over at least 3 days a week.  An example of this would be exercising 30 minutes a day, 5 days a week.  Resistance training can increase muscle mass and increase our resting metabolic rate.   FULL BODY resistance training is recommended 2-3 times a week.  Do not do this on consecutive days to allow for muscle recovery.    Aim for a bare minimum 5000 steps, even on days you do not exercise.    Monitoring:   Weigh yourself daily.  If this causes undue stress, then just weigh yourself once a week.  Weigh yourself the same time of the day with the same amount of clothing on.  Preferably this should be done after waking up, before you eat, and with no clothing or minimal clothing on.    Specific Goals:  Food log (ie.) www.myfitnesspal.com,sparkpeople.com,loseit.com,calorieking.com,etc.     No sugary beverages. At least 64oz of water daily.    Increase physical activity by 10 minutes daily    Continue with phentermine 37.5mg weekly.    Nurse visit in 3 months.

## 2024-08-22 NOTE — PROGRESS NOTES
Assessment/Plan:  Kim was seen today for follow-up.    Diagnoses and all orders for this visit:    Obesity, morbid, BMI 50 or higher (HCC)  -     phentermine (ADIPEX-P) 37.5 MG tablet; Take 1 tablet (37.5 mg total) by mouth in the morning      nitial weight: 344  Phentermine start: 339 (2/2024)  Last OV weight: 328  Current weight: 301  Change in weight: -38lbs (11.2%)    - Weight not at goal  - Patient is interested in Conservative Program  - Labs reviewed: As below.    General Recommendations:  Nutrition:  Eat breakfast daily.  Do not skip meals.     Food log (ie.) www.myfitnesspal.com, sparkpeople.com, loseit.com, calorieking.com, etc.    Practice mindful eating.  Be sure to set aside time to eat, eat slowly, and savor your food.    Hydration:    At least 64oz of water daily.  No sugar sweetened beverages.  No juice (eat the fruit instead).    Exercise:  Studies have shown that the ideal exercise goal is somewhere between 150 to 300 minutes of moderate intensity exercise a week.  Start with exercising 10 minutes every other day and gradually increase physical activity with a goal of at least 150 minutes of moderate intensity exercise a week, divided over at least 3 days a week.  An example of this would be exercising 30 minutes a day, 5 days a week.  Resistance training can increase muscle mass and increase our resting metabolic rate.   FULL BODY resistance training is recommended 2-3 times a week.  Do not do this on consecutive days to allow for muscle recovery.    Aim for a bare minimum 5000 steps, even on days you do not exercise.    Monitoring:   Weigh yourself daily.  If this causes undue stress, then just weigh yourself once a week.  Weigh yourself the same time of the day with the same amount of clothing on.  Preferably this should be done after waking up, before you eat, and with no clothing or minimal clothing on.    Specific Goals:  Food log (ie.)  www.myfitnesspal.com,KidBook.com,loseit.com,[employer].com,etc.     No sugary beverages. At least 64oz of water daily.    Increase physical activity by 10 minutes daily    Continue with phentermine 37.5mg weekly.    Nurse visit in 3 months.    Total time spent reviewing chart, interviewing patient, examining patient, discussing plan, answering all questions, and documentin min.       ______________________________________________________________________      Subjective:   Chief Complaint   Patient presents with    Follow-up     MWM-4M F/u; Waist-50.5in     Patient here to discuss weight associated problems and nutrition goals  HPI: Kim Mojica  is a 38 y.o. female with history of excess weight.  Weight loss plan:  Conservative Program.   Most recent notes and records were reviewed.    Wt Readings from Last 10 Encounters:   24 (!) 137 kg (301 lb)   24 (!) 147 kg (323 lb)   24 (!) 146 kg (322 lb 12.8 oz)   24 (!) 149 kg (328 lb 9.6 oz)   24 (!) 150 kg (330 lb)   24 (!) 150 kg (331 lb)   24 (!) 152 kg (336 lb)   23 (!) 154 kg (339 lb 3.2 oz)   23 (!) 151 kg (333 lb)   23 (!) 156 kg (344 lb 12.8 oz)     Initial weight: 344  Phentermine start: 339 (2024)  Last OV weight: 328  Current weight: 301  Change in weight: -38lbs (11.2%)   Decreased appetite and food noise with phentamine 37.5mg daily.  No AR or SE.  Improved energy  Hunger/Cravings: No  Dining out: once every 2 weeks  Hydration:  Water 80  Alcohol:  No  Exercise:  A lot of walking around at [employer] (where she works)   Sleep:  Improved  Weight Monitoring:  Weekly      Denies Hx of CAD, PAD, palpitations, arrhythmia.   Denies uncontrolled anxiety or depression, suicidal ideation or behavior, insomnia or sleep disturbance.     The following portions of the patient's history were reviewed and updated as appropriate: allergies, current medications, past family history, past medical history,  "past social history, past surgical history, and problem list.    Review Of Systems:  Review of Systems   Constitutional:  Negative for activity change, appetite change, fatigue and fever.   Respiratory:  Negative for cough and shortness of breath.    Cardiovascular:  Negative for chest pain, palpitations and leg swelling.   Gastrointestinal:  Negative for abdominal pain, constipation, diarrhea, nausea and vomiting.   Endocrine: Negative for cold intolerance and heat intolerance.   Genitourinary:  Negative for difficulty urinating and dysuria.   Musculoskeletal:  Negative for arthralgias, back pain and gait problem.   Skin:  Negative for pallor and rash.   Neurological:  Negative for headaches.   Psychiatric/Behavioral:  Negative for dysphoric mood, sleep disturbance and suicidal ideas (or HI). The patient is not nervous/anxious.      Objective:  /75   Pulse 99   Temp 98.7 °F (37.1 °C)   Resp 17   Ht 5' 5\" (1.651 m)   Wt (!) 137 kg (301 lb)   BMI 50.09 kg/m²   Physical Exam    Labs and Imaging  Recent labs and imaging have been personally reviewed.  Lab Results   Component Value Date    WBC 14.21 (H) 12/08/2015    HGB 10.4 (L) 12/08/2015    HCT 32.4 (L) 12/08/2015    MCV 82 12/08/2015     12/08/2015     Lab Results   Component Value Date    SODIUM 138 05/08/2024    K 4.3 05/08/2024     05/08/2024    CO2 24 05/08/2024    AGAP 9 05/08/2024    BUN 10 05/08/2024    CREATININE 0.60 05/08/2024    GLUC 76 05/08/2024    CALCIUM 9.0 05/08/2024    AST 15 05/08/2024    ALT 19 05/08/2024    ALKPHOS 57 05/08/2024    TP 6.7 05/08/2024    TBILI 0.6 05/08/2024    EGFR 116 05/08/2024     Lab Results   Component Value Date    HGBA1C 5.3 05/08/2024     Lab Results   Component Value Date    JNW2KUCKUVTK 2.055 12/15/2023    TSH 1.92 05/08/2024       "

## 2024-09-17 NOTE — TELEPHONE ENCOUNTER
Patient called requesting refill for Junel. Patient made aware medication was refilled on 6/12/24 for 90 with 3 refills to [facility] pharmacy. Patient instructed to contact the pharmacy to obtain refills of medication. Patient verbalized understanding.

## 2024-09-23 ENCOUNTER — TELEPHONE (OUTPATIENT)
Age: 39
End: 2024-09-23

## 2024-09-23 NOTE — TELEPHONE ENCOUNTER
Patient calling in stating that the medication Ha Fe will take time to come in to the pharmacy for refill as the pharmacy doesn't keep this med on hand.. Patient is asking for a substitute for the medication as pt reporting that she wont receive the medication on time. Patient reporting that she called last week to have it refilled and pt reporting that the medication is ordered because the pharmacy doesn't keep it in stock.

## 2024-09-24 DIAGNOSIS — Z30.018 ENCOUNTER FOR INITIAL PRESCRIPTION OF OTHER CONTRACEPTIVES: ICD-10-CM

## 2024-09-24 RX ORDER — NORETHINDRONE ACETATE AND ETHINYL ESTRADIOL .03; 1.5 MG/1; MG/1
1 TABLET ORAL DAILY
Qty: 84 TABLET | Refills: 3 | Status: SHIPPED | OUTPATIENT
Start: 2024-09-24

## 2024-09-24 NOTE — TELEPHONE ENCOUNTER
Patient was contacted and notified of Dr Francisco's recommendations of:    I sent the generic please call and tell the pt  They should of filled it already not sure why they did not substitute it that is standard practice  But it is there now.     Pt verbalized understanding, no further questions at this time

## 2024-09-25 DIAGNOSIS — E66.01 OBESITY, MORBID, BMI 50 OR HIGHER (HCC): ICD-10-CM

## 2024-09-25 RX ORDER — PHENTERMINE HYDROCHLORIDE 37.5 MG/1
37.5 TABLET ORAL DAILY
Qty: 30 TABLET | Refills: 1 | Status: SHIPPED | OUTPATIENT
Start: 2024-09-25

## 2024-09-25 NOTE — TELEPHONE ENCOUNTER
Medication: phentermine 37.5 mg  PDMP    08/22/2024 08/22/2024 Phentermine Hcl (Tablet) 30.0 30 37.5 MG NA VALE GIRON [employer] Commercial Insurance 0 / 0 [place]   1 [id number] 07/25/2024 07/25/2024 Phentermine Hcl (Tablet) 30.0 30 37.5 MG NA BRADEN FERNÁNDEZ

## 2024-09-25 NOTE — TELEPHONE ENCOUNTER
Reason for call:   [x] Refill   [] Prior Auth  [] Other:     Office:   [] PCP/Provider -   [x] Specialty/Provider -     Medication: Adipex-P    Dose/Frequency: 37.5 tablet    Quantity: #30    Pharmacy: [facility]ika Maier    Does the patient have enough for 3 days?   [] Yes   [x] No - Send as HP to POD

## 2024-10-29 DIAGNOSIS — E66.01 OBESITY, MORBID, BMI 50 OR HIGHER (HCC): ICD-10-CM

## 2024-10-29 RX ORDER — PHENTERMINE HYDROCHLORIDE 37.5 MG/1
37.5 TABLET ORAL DAILY
Qty: 30 TABLET | Refills: 0 | Status: SHIPPED | OUTPATIENT
Start: 2024-10-29

## 2024-10-29 NOTE — TELEPHONE ENCOUNTER
30 day rx approved  Has not seen provider since August  Please change upcoming nurse visit to provider visit  Thanks

## 2024-11-18 ENCOUNTER — CLINICAL SUPPORT (OUTPATIENT)
Dept: BARIATRICS | Facility: CLINIC | Age: 39
End: 2024-11-18

## 2024-11-18 VITALS
DIASTOLIC BLOOD PRESSURE: 84 MMHG | RESPIRATION RATE: 16 BRPM | HEIGHT: 65 IN | HEART RATE: 98 BPM | BODY MASS INDEX: 48.82 KG/M2 | TEMPERATURE: 98.3 F | SYSTOLIC BLOOD PRESSURE: 124 MMHG | WEIGHT: 293 LBS

## 2024-11-18 DIAGNOSIS — E66.01 OBESITY, MORBID, BMI 50 OR HIGHER (HCC): ICD-10-CM

## 2024-11-18 DIAGNOSIS — R63.5 ABNORMAL WEIGHT GAIN: Primary | ICD-10-CM

## 2024-11-18 PROCEDURE — RECHECK

## 2024-11-18 RX ORDER — PHENTERMINE HYDROCHLORIDE 37.5 MG/1
37.5 TABLET ORAL DAILY
Qty: 30 TABLET | Refills: 1 | Status: SHIPPED | OUTPATIENT
Start: 2024-11-18

## 2024-11-18 NOTE — PROGRESS NOTES
Patient last visit weight: 301lbs   Patient current visit weight: 295.2lbs     If you are taking phentermine or other oral weight loss medications, are you experiencing any of the following symptoms:  Headache: NO  Blurred Vision: NO   Chest Pain: NO   Palpitations: NO   Insomnia: NO   SPECIFY ORAL MEDICATION AND DOSAGE: Phentermine 37.5mg. Patient requesting refills.    If you are taking an injectable medication,  are you experiencing any of the following symptoms:  Bloating:   Nausea:  Vomiting:   Constipation:   Diarrhea:  SPECIFY INJECTABLE MEDICATION AND CURRENT DOSAGE:      Vitals:    Is BP less than 100/60? NO   Is BP greater than 140/90? NO   Is HR greater than 100? NO   **If yes to any of the above, have patient relax and repeat in 5-10 minutes**    Repeat values:    Is BP less than 100/60?  Is BP greater than 140/90?  Is HR greater than 100?  **If values remain outside of ranges above, please consult provider for next steps**

## 2025-01-03 DIAGNOSIS — E66.01 OBESITY, MORBID, BMI 50 OR HIGHER (HCC): ICD-10-CM

## 2025-01-03 DIAGNOSIS — Z30.018 ENCOUNTER FOR INITIAL PRESCRIPTION OF OTHER CONTRACEPTIVES: ICD-10-CM

## 2025-01-06 NOTE — TELEPHONE ENCOUNTER
Refill must be reviewed and completed by the office or provider. The refill is unable to be approved or denied by the medication management team.    Refill can not be delegated      Patient Id Prescription # Filled Written Drug Label Qty Days Strength MME** Prescriber Pharmacy Payment REFILL #/Auth State Detail  1 [id number] 01/04/2025 11/18/2024 Phentermine Hcl (Tablet) 30.0 30 37.5 MG NA JOLENE, POLIMERLENE [employer] Commercial Insurance 1 / 1 [place]   1 [id number] 11/26/2024 11/18/2024 Phentermine Hcl (Tablet) 30.0 30 37.5 MG NA JOLENE, POLIZZANO [employer] Commercial Insurance 0 / 1 [place]

## 2025-01-08 RX ORDER — PHENTERMINE HYDROCHLORIDE 37.5 MG/1
37.5 TABLET ORAL DAILY
Qty: 30 TABLET | Refills: 2 | Status: SHIPPED | OUTPATIENT
Start: 2025-01-08

## 2025-01-21 RX ORDER — NORETHINDRONE ACETATE AND ETHINYL ESTRADIOL .03; 1.5 MG/1; MG/1
1 TABLET ORAL DAILY
Qty: 84 TABLET | Refills: 0 | Status: SHIPPED | OUTPATIENT
Start: 2025-01-21

## 2025-02-05 DIAGNOSIS — E66.01 OBESITY, MORBID, BMI 50 OR HIGHER (HCC): ICD-10-CM

## 2025-02-05 NOTE — TELEPHONE ENCOUNTER
Refill must be reviewed and completed by the office or provider. The refill is unable to be approved or denied by the medication management team.    Refill can not be delegated      Patient Id Prescription # Filled Written Drug Label Qty Days Strength MME** Prescriber Pharmacy Payment REFILL #/Auth State Detail  1 [id number] 01/04/2025 11/18/2024 Phentermine Hcl (Tablet) 30.0 30 37.5 MG NA JOLENE, POLIZZANO [facility] Commercial Insurance 1 / 1 [place]   1 [id number] 11/26/2024 11/18/2024 Phentermine Hcl (Tablet) 30.0 30 37.5 MG NA JOLENE, POLIZZANO [facility] Commercial Insurance 0 / 1 [place]   1 [id number] 10/29/2024 10/29/2024 Phentermine Hcl (Tablet) 30.0 30 37.5 MG NA EMILY LOMELI [facility] Commercial Insurance 0 / 0 [place]

## 2025-02-07 RX ORDER — PHENTERMINE HYDROCHLORIDE 37.5 MG/1
37.5 TABLET ORAL DAILY
Qty: 30 TABLET | Refills: 2 | Status: SHIPPED | OUTPATIENT
Start: 2025-02-07

## 2025-03-13 DIAGNOSIS — E66.01 OBESITY, MORBID, BMI 50 OR HIGHER (HCC): ICD-10-CM

## 2025-03-13 RX ORDER — PHENTERMINE HYDROCHLORIDE 37.5 MG/1
37.5 TABLET ORAL DAILY
Qty: 30 TABLET | Refills: 0 | OUTPATIENT
Start: 2025-03-13

## 2025-03-13 NOTE — TELEPHONE ENCOUNTER
02/07/2025 02/07/2025 Phentermine Hcl (Tablet) 30.0 30 37.5 MG NA EMILY LOMELI [facility] Commercial Insurance 0 / 2 [place]      1 [id number] 01/04/2025 11/18/2024 Phentermine Hcl (Tablet) 30.0 30 37.5 MG NA BRADEN FERNÁNDEZ [employer] [facility] Commercial Insurance 1 / 1 [place]    1 [id number] 11/26/2024 11/18/2024 Phentermine Hcl (Tablet) 30.0 30 37.5 MG NA JOLENE POLIMERLENE [facility]

## 2025-03-13 NOTE — TELEPHONE ENCOUNTER
This was prescribed in February with 2 refills- refills should be available at pharmacy until upcoming visit with Ana in April

## 2025-04-15 ENCOUNTER — OFFICE VISIT (OUTPATIENT)
Dept: BARIATRICS | Facility: CLINIC | Age: 40
End: 2025-04-15
Payer: COMMERCIAL

## 2025-04-15 VITALS
WEIGHT: 293 LBS | BODY MASS INDEX: 48.82 KG/M2 | DIASTOLIC BLOOD PRESSURE: 79 MMHG | HEIGHT: 65 IN | TEMPERATURE: 97.6 F | HEART RATE: 110 BPM | SYSTOLIC BLOOD PRESSURE: 126 MMHG | RESPIRATION RATE: 16 BRPM

## 2025-04-15 DIAGNOSIS — E66.813 CLASS 3 SEVERE OBESITY DUE TO EXCESS CALORIES WITH BODY MASS INDEX (BMI) OF 45.0 TO 49.9 IN ADULT: Primary | ICD-10-CM

## 2025-04-15 PROCEDURE — 99214 OFFICE O/P EST MOD 30 MIN: CPT | Performed by: PHYSICIAN ASSISTANT

## 2025-04-15 RX ORDER — PHENTERMINE HYDROCHLORIDE 37.5 MG/1
37.5 TABLET ORAL DAILY
Qty: 30 TABLET | Refills: 2 | Status: SHIPPED | OUTPATIENT
Start: 2025-04-15

## 2025-04-15 RX ORDER — TOPIRAMATE 25 MG/1
TABLET, FILM COATED ORAL
Qty: 60 TABLET | Refills: 2 | Status: SHIPPED | OUTPATIENT
Start: 2025-04-15

## 2025-04-15 NOTE — ASSESSMENT & PLAN NOTE
-Patient is pursuing Conservative Program  -Initial weight loss goal of 5-10% weight loss for improved health  -Screening labs 5/8/24        Initial weight: 344  Phentermine start: 339 (2/2024)  Current weight: 296.8  Change in weight: -47.2lbs (12.5%)    To continue on phentermine. Discussed other options such as zepbound/wegovy or addition of topamax and will start with topamax. Side effects discussed .      Recommend trying to increase activity  Continue with water intake  To continue to have a protein with meals and ensure enough vegetable intake

## 2025-04-15 NOTE — PROGRESS NOTES
Assessment/Plan:    Class 3 severe obesity due to excess calories with body mass index (BMI) of 45.0 to 49.9 in adult ([facility])  -Patient is pursuing Conservative Program  -Initial weight loss goal of 5-10% weight loss for improved health  -Screening labs 5/8/24        Initial weight: 344  Phentermine start: 339 (2/2024)  Current weight: 296.8  Change in weight: -47.2lbs (12.5%)    To continue on phentermine. Discussed other options such as zepbound/wegovy or addition of topamax and will start with topamax. Side effects discussed .      Recommend trying to increase activity  Continue with water intake  To continue to have a protein with meals and ensure enough vegetable intake        Return in about 3 months (around 7/15/2025).       Diagnoses and all orders for this visit:    Class 3 severe obesity due to excess calories with body mass index (BMI) of 45.0 to 49.9 in adult ([facility])  -     topiramate (Topamax) 25 mg tablet; Take 1 tablet at night for a week and then take 1 tablet 2 times a day    BMI 45.0-49.9, adult ([facility])  -     phentermine (ADIPEX-P) 37.5 MG tablet; Take 1 tablet (37.5 mg total) by mouth in the morning          Subjective:   Chief Complaint   Patient presents with    Follow-up     MWM-6M F/u; Waist-50in        Patient ID: Kim Mojica  is a 39 y.o. female with excess weight/obesity here to pursue weight managment.  Patient is pursuing Conservative Program.     HPI  On phentermine 37.5mg.  Weight has been stable since November.  She denies any side effects. She does need to concentrate more on food choices than prior  Wt Readings from Last 10 Encounters:   04/15/25 135 kg (296 lb 12.8 oz)   11/18/24 134 kg (295 lb 3.2 oz)   08/22/24 (!) 137 kg (301 lb)   05/28/24 (!) 147 kg (323 lb)   05/21/24 (!) 146 kg (322 lb 12.8 oz)   04/18/24 (!) 149 kg (328 lb 9.6 oz)   04/02/24 (!) 150 kg (330 lb)   03/05/24 (!) 150 kg (331 lb)   01/17/24 (!) 152 kg (336 lb)   12/20/23 (!) 154 kg (339 lb 3.2 oz)       Exercise:  varies  About 6000 steps a day  Hydration: water 60-70 oz min, coffe 12 oz    Diet recall:  Protein shake(premier)  or egg or yogurt greek  Bowls-rice, meat and vegetable   Protein (beef/pork) , starch , vegetable  Chex mix sometimes for snakc but varies       The following portions of the patient's history were reviewed and updated as appropriate: She  has no past medical history on file.  She   Patient Active Problem List    Diagnosis Date Noted    Class 3 severe obesity due to excess calories with body mass index (BMI) of 45.0 to 49.9 in adult ([facility]) 08/17/2023     She  has a past surgical history that includes Ankle surgery.  Her family history is not on file.  She  reports that she has never smoked. She has never used smokeless tobacco. She reports that she does not currently use alcohol. She reports that she does not currently use drugs.  Current Outpatient Medications   Medication Sig Dispense Refill    Ha Fe 1.5/30 1.5-30 MG-MCG tablet Take 1 tablet by mouth daily 90 tablet 3    Norethindrone Acet-Ethinyl Est (Junel 1.5/30) 1.5-30 MG-MCG TABS Take 1 tablet by mouth in the morning 84 tablet 0    norethindrone-ethinyl estradiol-iron (Patricia FE 1.5/30) 1.5-30 MG-MCG tablet Take 1 tablet by mouth daily 90 tablet 3    phentermine (ADIPEX-P) 37.5 MG tablet Take 1 tablet (37.5 mg total) by mouth in the morning 30 tablet 2    topiramate (Topamax) 25 mg tablet Take 1 tablet at night for a week and then take 1 tablet 2 times a day 60 tablet 2     No current facility-administered medications for this visit.     Current Outpatient Medications on File Prior to Visit   Medication Sig    Ha Fe 1.5/30 1.5-30 MG-MCG tablet Take 1 tablet by mouth daily    Norethindrone Acet-Ethinyl Est (Junel 1.5/30) 1.5-30 MG-MCG TABS Take 1 tablet by mouth in the morning    norethindrone-ethinyl estradiol-iron (Patricia FE 1.5/30) 1.5-30 MG-MCG tablet Take 1 tablet by mouth daily    [DISCONTINUED] phentermine (ADIPEX-P) 37.5 MG tablet  "Take 1 tablet (37.5 mg total) by mouth in the morning     No current facility-administered medications on file prior to visit.     She is allergic to hydrocodone..    Review of Systems   Constitutional:  Negative for fever.   Respiratory:  Negative for shortness of breath.    Cardiovascular:  Negative for chest pain and palpitations.   Gastrointestinal:  Negative for abdominal pain, constipation, diarrhea and vomiting.   Genitourinary:  Negative for difficulty urinating.   Skin:  Negative for rash.   Neurological:  Negative for headaches.   Psychiatric/Behavioral:  Negative for dysphoric mood. The patient is not nervous/anxious.        Objective:    /79   Pulse (!) 110   Temp 97.6 °F (36.4 °C)   Resp 16   Ht 5' 5\" (1.651 m)   Wt 135 kg (296 lb 12.8 oz)   BMI 49.39 kg/m²      Physical Exam  Vitals and nursing note reviewed.   Constitutional:       General: She is not in acute distress.     Appearance: She is well-developed. She is obese.   HENT:      Head: Normocephalic and atraumatic.   Eyes:      Conjunctiva/sclera: Conjunctivae normal.   Neck:      Thyroid: No thyromegaly.   Pulmonary:      Effort: Pulmonary effort is normal. No respiratory distress.   Skin:     Findings: No rash (visible).   Neurological:      Mental Status: She is alert and oriented to person, place, and time.   Psychiatric:         Behavior: Behavior normal.         "

## 2025-05-20 RX ORDER — PHENTERMINE HYDROCHLORIDE 37.5 MG/1
37.5 TABLET ORAL DAILY
Qty: 30 TABLET | Refills: 2 | Status: SHIPPED | OUTPATIENT
Start: 2025-05-20

## 2025-05-20 NOTE — TELEPHONE ENCOUNTER
Refill must be reviewed and completed by the office or provider. The refill is unable to be approved or denied by the medication management team.    Refill can not be delegated       Patient Id Prescription # Sold Filled Written Drug Label Qty Days Strength MME* Prescriber Pharmacy Payment REFILL #/Auth State Detail  1 [id number] ** 04/16/2025 04/15/2025 Phentermine Hcl (Tablet) 30.0 30 37.5 MG NA [facility] Commercial Insurance 0 / 2 [place]   1 [id number] ** 03/13/2025 02/07/2025 Phentermine Hcl (Tablet) 30.0 30 37.5 MG NA EMILY LOMELI [facility] Commercial Insurance 1 / 2 [place]

## 2025-05-21 DIAGNOSIS — Z30.018 ENCOUNTER FOR INITIAL PRESCRIPTION OF OTHER CONTRACEPTIVES: ICD-10-CM

## 2025-05-23 RX ORDER — NORETHINDRONE ACETATE AND ETHINYL ESTRADIOL 1.5; 3 MG/1; UG/1
1 TABLET ORAL EVERY MORNING
Qty: 84 TABLET | Refills: 0 | Status: SHIPPED | OUTPATIENT
Start: 2025-05-23

## 2025-05-30 RX ORDER — NORETHINDRONE ACETATE AND ETHINYL ESTRADIOL .03; 1.5 MG/1; MG/1
1 TABLET ORAL DAILY
Qty: 84 TABLET | Refills: 0 | Status: SHIPPED | OUTPATIENT
Start: 2025-05-30

## 2025-06-02 ENCOUNTER — OFFICE VISIT (OUTPATIENT)
Dept: PODIATRY | Facility: CLINIC | Age: 40
End: 2025-06-02
Payer: COMMERCIAL

## 2025-06-02 VITALS — BODY MASS INDEX: 46.61 KG/M2 | HEIGHT: 66 IN | WEIGHT: 290 LBS

## 2025-06-02 DIAGNOSIS — M12.571 TRAUMATIC ARTHRITIS OF RIGHT ANKLE: Primary | ICD-10-CM

## 2025-06-02 DIAGNOSIS — S82.891S ANKLE FRACTURE, RIGHT, SEQUELA: ICD-10-CM

## 2025-06-02 PROCEDURE — 99203 OFFICE O/P NEW LOW 30 MIN: CPT | Performed by: PODIATRIST

## 2025-06-02 NOTE — ASSESSMENT & PLAN NOTE
XR report reviewed, there is significant damage to her ankle from the original injury. I would recommend a CT scan to get a much clearer picture.     In the meantime, can supply with lace up ankle brace.   RICE protocol  RTC 4 weeks, I will contact her once I get CT results.   Orders:    Brace    CT lower extremity wo contrast right; Future

## 2025-06-02 NOTE — PROGRESS NOTES
"Name: Kim Mojica      : 1985      MRN: [id number]  Encounter Provider: Rony Castro DPM  Encounter Date: 2025   Encounter department: [facility] [place]  :  Assessment & Plan  Traumatic arthritis of right ankle  XR report reviewed, there is significant damage to her ankle from the original injury. I would recommend a CT scan to get a much clearer picture.     In the meantime, can supply with lace up ankle brace.   RICE protocol  RTC 4 weeks, I will contact her once I get CT results.   Orders:    Brace    CT lower extremity wo contrast right; Future    Ankle fracture, right, sequela    Orders:    Brace    CT lower extremity wo contrast right; Future    Impression: Single tendon anchor in the medial malleolus.   Lateral fibular plate and screws with 2 transsyndesmotic screws with minimal   lucency around the syndesmotic screws which could represent loosening.   Several corticated ossicles at the medial malleolus consistent with chronic   evulsion injury, the largest measures 5 mm. No visible lucent fracture line.   Severe ankle osteoarthritis. Multiple intra-articular osteochondral bodies the   largest anteriorly measuring 7 mm.     Widening of the medial clear space and syndesmotic spaces suggests ligamentous   injury.     History of Present Illness   HPI  Kim Mojica is a 39 y.o. female who presents with right ankle pain. She broke it in  and has retained hardware. She had to have 2 surgeries on her ankle but it has never really healed well. No recent trauma but the day to day pain is getting a lot worse.       Review of Systems  As stated in HPI, otherwise normal    Medical History Reviewed by provider this encounter:  Tobacco  Allergies  Meds  Problems  Med Hx  Surg Hx  Fam Hx           Objective   Ht 5' 6\" (1.676 m)   Wt 132 kg (290 lb)   BMI 46.81 kg/m²      Physical Exam  Vitals reviewed.     Cardiovascular:      Pulses: Normal pulses.     Musculoskeletal:        "  General: Swelling, tenderness and deformity present.      Right ankle: Swelling and deformity present. No ecchymosis or lacerations. Tenderness present over the lateral malleolus, medial malleolus, ATF ligament and CF ligament. No proximal fibula tenderness. Decreased range of motion. Anterior drawer test positive. Normal pulse.     Skin:     Capillary Refill: Capillary refill takes less than 2 seconds.      Findings: No erythema.     Neurological:      Mental Status: She is alert.      Sensory: No sensory deficit.         XR report from last week:  Impression: Single tendon anchor in the medial malleolus.   Lateral fibular plate and screws with 2 transsyndesmotic screws with minimal   lucency around the syndesmotic screws which could represent loosening.   Several corticated ossicles at the medial malleolus consistent with chronic   evulsion injury, the largest measures 5 mm. No visible lucent fracture line.   Severe ankle osteoarthritis. Multiple intra-articular osteochondral bodies the   largest anteriorly measuring 7 mm.     Widening of the medial clear space and syndesmotic spaces suggests ligamentous   injury.

## 2025-06-04 ENCOUNTER — TELEPHONE (OUTPATIENT)
Age: 40
End: 2025-06-04

## 2025-06-04 NOTE — TELEPHONE ENCOUNTER
Caller: Kim Mojica    Doctor: Dr. Castro / Juan    Reason for call: Kim needs to have a CT scan.  How does she go about getting one?  Will she need an order?  Can someone please reach out to her?  Thank you.    Call back#: [phone]

## 2025-06-17 ENCOUNTER — HOSPITAL ENCOUNTER (OUTPATIENT)
Dept: CT IMAGING | Facility: HOSPITAL | Age: 40
Discharge: HOME | End: 2025-06-17
Attending: PODIATRIST
Payer: COMMERCIAL

## 2025-06-17 DIAGNOSIS — M12.571 TRAUMATIC ARTHRITIS OF RIGHT ANKLE: ICD-10-CM

## 2025-06-17 DIAGNOSIS — S82.891S ANKLE FRACTURE, RIGHT, SEQUELA: ICD-10-CM

## 2025-06-17 PROCEDURE — 73700 CT LOWER EXTREMITY W/O DYE: CPT

## 2025-06-18 RX ORDER — PHENTERMINE HYDROCHLORIDE 37.5 MG/1
37.5 TABLET ORAL DAILY
Qty: 30 TABLET | Refills: 1 | Status: SHIPPED | OUTPATIENT
Start: 2025-06-18

## 2025-06-18 NOTE — TELEPHONE ENCOUNTER
Refill must be reviewed and completed by the office or provider. The refill is unable to be approved or denied by the medication management team.    Refill can not be delegated       Patient Id Prescription # Sold Filled Written Drug Label Qty Days Strength MME* Prescriber Pharmacy Payment REFILL #/Auth State Detail  1 [id number] ** 05/20/2025 05/20/2025 Phentermine Hcl (Tablet) 30.0 30 37.[facility] Commercial Insurance 0 / 2 [place]   1 [id number] ** 04/16/2025 04/15/2025 Phentermine Hcl (Tablet) 30.0 30 37.5 MG NA MENA [facility] Commercial Insurance 0 / 2 [place]

## 2025-06-23 ENCOUNTER — RESULTS FOLLOW-UP (OUTPATIENT)
Dept: PODIATRY | Facility: CLINIC | Age: 40
End: 2025-06-23

## 2025-07-07 ENCOUNTER — OFFICE VISIT (OUTPATIENT)
Dept: PODIATRY | Facility: CLINIC | Age: 40
End: 2025-07-07
Payer: COMMERCIAL

## 2025-07-07 VITALS — HEIGHT: 66 IN | BODY MASS INDEX: 46.61 KG/M2 | WEIGHT: 290 LBS

## 2025-07-07 DIAGNOSIS — S82.891S ANKLE FRACTURE, RIGHT, SEQUELA: ICD-10-CM

## 2025-07-07 DIAGNOSIS — M12.571 TRAUMATIC ARTHRITIS OF RIGHT ANKLE: Primary | ICD-10-CM

## 2025-07-07 PROCEDURE — 99214 OFFICE O/P EST MOD 30 MIN: CPT | Performed by: PODIATRIST

## 2025-07-07 PROCEDURE — 20605 DRAIN/INJ JOINT/BURSA W/O US: CPT | Performed by: PODIATRIST

## 2025-07-07 RX ORDER — LIDOCAINE HYDROCHLORIDE 5 MG/ML
1 INJECTION, SOLUTION INFILTRATION; PERINEURAL
Status: SHIPPED | OUTPATIENT
Start: 2025-07-07

## 2025-07-07 RX ORDER — TRIAMCINOLONE ACETONIDE 40 MG/ML
40 INJECTION, SUSPENSION INTRA-ARTICULAR; INTRAMUSCULAR
Status: SHIPPED | OUTPATIENT
Start: 2025-07-07

## 2025-07-07 RX ADMIN — LIDOCAINE HYDROCHLORIDE 1 ML: 5 INJECTION, SOLUTION INFILTRATION; PERINEURAL at 09:00

## 2025-07-07 RX ADMIN — TRIAMCINOLONE ACETONIDE 40 MG: 40 INJECTION, SUSPENSION INTRA-ARTICULAR; INTRAMUSCULAR at 09:00

## 2025-07-07 NOTE — ASSESSMENT & PLAN NOTE
CT reviewed. She has severe DJD of her right ankle and malalignment of the talus. One of the screws appears to be intraarticular as well. Given the significant issues with her ankle joint, her options are limited. I advised her to discuss ankle fusion vs reconstruction vs arthroplasty with our F&A orthopedic surgeon.     Provided joint cortisone injection today. See procedure below.    F/U Dr. Lachman  Orders:  •  Ambulatory Referral to Orthopedic Surgery; Future  •  Medium joint arthrocentesis: R ankle  •  lidocaine (XYLOCAINE) 0.5 % injection 1 mL  •  triamcinolone acetonide (Kenalog-40) 40 mg/mL injection 40 mg

## 2025-07-07 NOTE — ASSESSMENT & PLAN NOTE
Orders:  •  Ambulatory Referral to Orthopedic Surgery; Future  •  Medium joint arthrocentesis: R ankle  •  lidocaine (XYLOCAINE) 0.5 % injection 1 mL  •  triamcinolone acetonide (Kenalog-40) 40 mg/mL injection 40 mg

## 2025-07-07 NOTE — PROGRESS NOTES
"Name: Kim Mojica      : 1985      MRN: [id number]  Encounter Provider: Rony Castro DPM  Encounter Date: 2025   Encounter department: [facility] [place]  :  Assessment & Plan  Traumatic arthritis of right ankle  CT reviewed. She has severe DJD of her right ankle and malalignment of the talus. One of the screws appears to be intraarticular as well. Given the significant issues with her ankle joint, her options are limited. I advised her to discuss ankle fusion vs reconstruction vs arthroplasty with our F&A orthopedic surgeon.     Provided joint cortisone injection today. See procedure below.    F/U Dr. Lachman  Orders:  •  Ambulatory Referral to Orthopedic Surgery; Future  •  Medium joint arthrocentesis: R ankle  •  lidocaine (XYLOCAINE) 0.5 % injection 1 mL  •  triamcinolone acetonide (Kenalog-40) 40 mg/mL injection 40 mg    Ankle fracture, right, sequela    Orders:  •  Ambulatory Referral to Orthopedic Surgery; Future  •  Medium joint arthrocentesis: R ankle  •  lidocaine (XYLOCAINE) 0.5 % injection 1 mL  •  triamcinolone acetonide (Kenalog-40) 40 mg/mL injection 40 mg    Medium joint arthrocentesis: R ankle    Performed by: Rony Castro DPM  Authorized by: Rony Castro DPM    Universal Protocol:  Consent: Verbal consent obtained  Risks and benefits: risks, benefits and alternatives were discussed  Consent given by: patient  Time out: Immediately prior to procedure a \"time out\" was called to verify the correct patient, procedure, equipment, support staff and site/side marked as required.  Timeout called at: 2025 9:38 AM.  Patient understanding: patient states understanding of the procedure being performed  Patient identity confirmed: verbally with patient  Supporting Documentation  Indications: diagnostic evaluation, joint swelling and pain   Procedure Details  Location: ankle - R ankle  Preparation: Patient was prepped and draped in the usual sterile " "fashion  Needle size: 25 G  Ultrasound guidance: no  Approach: anterolateral  Medications administered: 1 mL lidocaine 0.5 %; 40 mg triamcinolone acetonide 40 mg/mL              History of Present Illness   [facility]  Kim Mojica is a 39 y.o. female who presents for F/U of her recent CT scan. She had a severe ankle fracture in 2005 and has had 2 surgeries on the ankle in a different network. I gave her a lace up ankle brace which has some pain relief but still pain every day. She got her CT last week      History obtained from: patient    Review of Systems  As stated in HPI, otherwise normal    Medical History Reviewed by provider this encounter:  Tobacco  Allergies  Meds  Problems  Med Hx  Surg Hx  Fam Hx           Objective   Ht 5' 6\" (1.676 m)   Wt 132 kg (290 lb)   BMI 46.81 kg/m²      Physical Exam  Vitals reviewed.   Constitutional:       Appearance: She is obese. She is not ill-appearing.     Cardiovascular:      Pulses: Normal pulses.   Pulmonary:      Effort: No respiratory distress.     Musculoskeletal:         General: Swelling, tenderness and deformity present.     Neurological:      Mental Status: She is alert.      Sensory: No sensory deficit.     Limited right ankle ROM, positive lachmans test. NOrmal achilles. Crepitus with DF/PF of the ankle. Rotational stress Positive pain.     Administrative Statements   I have spent a total time of 45 minutes in caring for this patient on the day of the visit/encounter including Diagnostic results, Prognosis, Instructions for management, Patient and family education, Importance of tx compliance, Risk factor reductions, Impressions, Counseling / Coordination of care, Documenting in the medical record, and Reviewing/placing orders in the medical record (including tests, medications, and/or procedures).  "

## 2025-07-16 ENCOUNTER — APPOINTMENT (OUTPATIENT)
Dept: RADIOLOGY | Facility: AMBULARY SURGERY CENTER | Age: 40
End: 2025-07-16
Attending: ORTHOPAEDIC SURGERY
Payer: COMMERCIAL

## 2025-07-16 ENCOUNTER — OFFICE VISIT (OUTPATIENT)
Dept: OBGYN CLINIC | Facility: CLINIC | Age: 40
End: 2025-07-16
Payer: COMMERCIAL

## 2025-07-16 VITALS — HEIGHT: 66 IN | BODY MASS INDEX: 47.09 KG/M2 | WEIGHT: 293 LBS

## 2025-07-16 DIAGNOSIS — M25.571 PAIN, JOINT, ANKLE AND FOOT, RIGHT: ICD-10-CM

## 2025-07-16 DIAGNOSIS — Z01.89 ENCOUNTER FOR LOWER EXTREMITY COMPARISON IMAGING STUDY: ICD-10-CM

## 2025-07-16 DIAGNOSIS — M12.571 TRAUMATIC ARTHRITIS OF RIGHT ANKLE: Primary | ICD-10-CM

## 2025-07-16 DIAGNOSIS — S82.891S ANKLE FRACTURE, RIGHT, SEQUELA: ICD-10-CM

## 2025-07-16 PROCEDURE — 73600 X-RAY EXAM OF ANKLE: CPT

## 2025-07-16 PROCEDURE — 99244 OFF/OP CNSLTJ NEW/EST MOD 40: CPT | Performed by: ORTHOPAEDIC SURGERY

## 2025-07-16 PROCEDURE — 73610 X-RAY EXAM OF ANKLE: CPT

## 2025-07-16 NOTE — PROGRESS NOTES
James R Lachman, M.D.  Attending, Orthopaedic Surgery  Foot and Ankle  [facility]        ORTHOPAEDIC FOOT AND ANKLE CLINIC VISIT     Assessment and Plan     Assessment & Plan  Traumatic arthritis of right ankle  Pain, joint, ankle and foot, right  Encounter for lower extremity comparison imaging study  Ankle fracture, right, sequela    She has posttraumatic arthritis of the right ankle with an ankle malreduction of her syndesmosis with increased medial clear space and lateral shift of the talus  Discussed nonoperative and operative treatment options, of which she has already failed to obtain benefit from corticosteroid injection  Recommend trial of an [place] brace.  If she were to fail to obtain significant benefit from bracing then the next option would be either an ankle arthrodesis or arthroplasty  Continue weight loss program with goal BMI of 40 or at least less than 45 for anticipated future ankle surgery  Follow-up in 3 months for brace check and discussion of further treatment options      Orders:    XR ankle 3+ vw right; Future    XR ankle 2 vw left; Future    Ambulatory Referral to Orthopedic Surgery    Durable Medical Equipment          History of Present Illness:   Chief Complaint:   Chief Complaint   Patient presents with    Right Ankle - Pain     Had an injury in the past 20 years ago. Had 2 surgeries. Still has hardware in.      Kim Mojica is a 39 y.o. female who is being seen for right ankle pain.  Patient sustained an ankle fracture back in 2005 and is now status post 2 surgeries to her ankle 1 of which was an ORIF of the second of which the patient reports was a reconstruction procedure.  Pain is localized at anterior and deep ankle joint line with minimal radiating and described as sharp and severe. Patient denies numbness, tingling or radicular pain.  Denies history of neuropathy.  Patient does not  smoke, does not have diabetes and does not take blood thinners.   Patient denies family history of anesthesia complications and has not had any complications with anesthesia.     Pain/symptom timing:  Worse during the day when active  Pain/symptom context:  Worse with activites and work  Pain/symptom modifying factors:  Rest makes better, activities make worse  Pain/symptom associated signs/symptoms: none    Prior treatment   NSAIDsYes    Injections Yes   Bracing/Orthotics Yes   Physical Therapy No     Orthopedic Surgical History:   See below    Past Medical, Surgical and Social History:  Past Medical History:  has no past medical history on file.  Problem List: does not have any pertinent problems on file.  Past Surgical History:  has a past surgical history that includes Ankle surgery and Ankle fracture surgery (01/2005).  Family History: family history includes Breast cancer in her mother; Diabetes in her father.  Social History:  reports that she has never smoked. She has never used smokeless tobacco. She reports that she does not currently use alcohol. She reports that she does not use drugs.  Current Medications: has a current medication list which includes the following prescription(s): junel 1.5/30, norethindrone acet-ethinyl est, norethindrone-ethinyl estradiol-iron, phentermine, topiramate, and marlene fe 1.5/30, and the following Facility-Administered Medications: lidocaine and triamcinolone acetonide.  Allergies: is allergic to hydrocodone.     Review of Systems:  General- denies fever/chills  HEENT- denies hearing loss or sore throat  Eyes- denies eye pain or visual disturbances, denies red eyes  Respiratory- denies cough or SOB  Cardio- denies chest pain or palpitations  GI- denies abdominal pain  Endocrine- denies urinary frequency  Urinary- denies pain with urination  Musculoskeletal- Negative except noted above  Skin- denies rashes or wounds  Neurological- denies dizziness or headache  Psychiatric- denies anxiety or difficulty concentrating    Physical Exam:   Ht 5'  "6\" (1.676 m)   Wt 133 kg (294 lb 3.2 oz)   BMI 47.49 kg/m²   General/Constitutional: No apparent distress: well-nourished and well developed.  Eyes: normal ocular motion  Cardio: RRR, Normal S1S2, No m/r/g  Lymphatic: No appreciable lymphadenopathy  Respiratory: Non-labored breathing, CTA b/l no w/c/r  Vascular: No edema, swelling or tenderness, except as noted in detailed exam.  Integumentary: No impressive skin lesions present, except as noted in detailed exam.  Neuro: No ataxia or tremors noted  Psych: Normal mood and affect, oriented to person, place and time. Appropriate affect.  Musculoskeletal: Normal, except as noted in detailed exam and in HPI.    Examination    Right    Gait Antalgic   Musculoskeletal Tender to palpation at anterior ankle joint line    Skin Normal.      Nails Normal    Range of Motion  10 degrees dorsiflexion, 20 degrees plantarflexion  Subtalar motion: normal    Stability Stable    Muscle Strength 5/5 tibialis anterior  5/5 gastrocnemius-soleus  5/5 posterior tibialis  5/5 peroneal/eversion strength  5/5 EHL  5/5 FHL    Neurologic Normal    Sensation  Intact to light touch throughout sural, saphenous, superficial peroneal, deep peroneal and medial/lateral plantar nerve distributions.  Pipestone-Chandrika 5.07 filament (10g) testing  deferred.    Cardiovascular Brisk capillary refill < 2 seconds,intact DP and PT pulses    Special Tests None      Imaging Studies:   3 views of the right ankle were taken, reviewed and interpreted independently that demonstrate intact lateral plate with rigid syndesmotic fixation as well as a metal suture button in the medial malleolus.  The talus appears to be shifted laterally with an increased medial clear space and there is a widened syndesmosis as well as severe posttraumatic ankle arthritis.  Significant amount of anterior osteophytosis on the lateral view. Reviewed by me personally.        James R. Lachman, MD  Foot & Ankle Surgery   Department of " Orthopaedic Surgery  [facility]      I personally performed the service.    James R. Lachman, MD

## 2025-07-16 NOTE — ASSESSMENT & PLAN NOTE
She has posttraumatic arthritis of the right ankle with an ankle malreduction of her syndesmosis with increased medial clear space and lateral shift of the talus  Discussed nonoperative and operative treatment options, of which she has already failed to obtain benefit from corticosteroid injection  Recommend trial of an [place] brace.  If she were to fail to obtain significant benefit from bracing then the next option would be either an ankle arthrodesis or arthroplasty  Continue weight loss program with goal BMI of 40 or at least less than 45 for anticipated future ankle surgery  Follow-up in 3 months for brace check and discussion of further treatment options      Orders:    XR ankle 3+ vw right; Future    XR ankle 2 vw left; Future    Ambulatory Referral to Orthopedic Surgery    Durable Medical Equipment

## 2025-07-19 DIAGNOSIS — E66.813 CLASS 3 SEVERE OBESITY DUE TO EXCESS CALORIES WITH BODY MASS INDEX (BMI) OF 45.0 TO 49.9 IN ADULT: ICD-10-CM

## 2025-07-22 RX ORDER — PHENTERMINE HYDROCHLORIDE 37.5 MG/1
37.5 TABLET ORAL DAILY
Qty: 30 TABLET | Refills: 0 | Status: SHIPPED | OUTPATIENT
Start: 2025-07-22

## 2025-07-22 RX ORDER — TOPIRAMATE 25 MG/1
TABLET, FILM COATED ORAL
Qty: 60 TABLET | Refills: 0 | Status: SHIPPED | OUTPATIENT
Start: 2025-07-22

## 2025-07-24 ENCOUNTER — TELEPHONE (OUTPATIENT)
Dept: BARIATRICS | Facility: CLINIC | Age: 40
End: 2025-07-24

## 2025-07-24 ENCOUNTER — TELEPHONE (OUTPATIENT)
Dept: OTHER | Facility: OTHER | Age: 40
End: 2025-07-24

## 2025-07-24 NOTE — TELEPHONE ENCOUNTER
Patient is calling regarding cancelling an appointment.    Date/Time: 7/24 1400    Patient was rescheduled: YES [] NO [x]    Patient requesting call back to reschedule: YES [x] NO []

## 2025-08-22 RX ORDER — PHENTERMINE HYDROCHLORIDE 37.5 MG/1
37.5 TABLET ORAL DAILY
Qty: 30 TABLET | Refills: 0 | OUTPATIENT
Start: 2025-08-22